# Patient Record
Sex: MALE
[De-identification: names, ages, dates, MRNs, and addresses within clinical notes are randomized per-mention and may not be internally consistent; named-entity substitution may affect disease eponyms.]

---

## 2018-02-11 ENCOUNTER — HOSPITAL ENCOUNTER (INPATIENT)
Dept: HOSPITAL 88 - ER | Age: 73
LOS: 6 days | Discharge: HOME | DRG: 392 | End: 2018-02-17
Attending: INTERNAL MEDICINE | Admitting: INTERNAL MEDICINE
Payer: MEDICARE

## 2018-02-11 VITALS — SYSTOLIC BLOOD PRESSURE: 130 MMHG | DIASTOLIC BLOOD PRESSURE: 80 MMHG

## 2018-02-11 VITALS — HEIGHT: 69 IN | BODY MASS INDEX: 27.46 KG/M2 | WEIGHT: 185.44 LBS

## 2018-02-11 VITALS — DIASTOLIC BLOOD PRESSURE: 71 MMHG | SYSTOLIC BLOOD PRESSURE: 123 MMHG

## 2018-02-11 DIAGNOSIS — K64.8: ICD-10-CM

## 2018-02-11 DIAGNOSIS — D12.4: ICD-10-CM

## 2018-02-11 DIAGNOSIS — E87.6: ICD-10-CM

## 2018-02-11 DIAGNOSIS — K29.70: Primary | ICD-10-CM

## 2018-02-11 DIAGNOSIS — R74.8: ICD-10-CM

## 2018-02-11 DIAGNOSIS — E78.5: ICD-10-CM

## 2018-02-11 DIAGNOSIS — I77.810: ICD-10-CM

## 2018-02-11 DIAGNOSIS — I35.2: ICD-10-CM

## 2018-02-11 DIAGNOSIS — I25.10: ICD-10-CM

## 2018-02-11 DIAGNOSIS — E11.42: ICD-10-CM

## 2018-02-11 DIAGNOSIS — D12.5: ICD-10-CM

## 2018-02-11 DIAGNOSIS — E11.65: ICD-10-CM

## 2018-02-11 DIAGNOSIS — D12.2: ICD-10-CM

## 2018-02-11 DIAGNOSIS — I11.9: ICD-10-CM

## 2018-02-11 DIAGNOSIS — D64.9: ICD-10-CM

## 2018-02-11 DIAGNOSIS — I25.110: ICD-10-CM

## 2018-02-11 DIAGNOSIS — K20.9: ICD-10-CM

## 2018-02-11 LAB
ALBUMIN SERPL-MCNC: 4.2 G/DL (ref 3.5–5)
ALBUMIN/GLOB SERPL: 1.1 {RATIO} (ref 0.8–2)
ALP SERPL-CCNC: 70 IU/L (ref 40–150)
ALT SERPL-CCNC: 11 IU/L (ref 0–55)
ANION GAP SERPL CALC-SCNC: 15.6 MMOL/L (ref 8–16)
BASOPHILS # BLD AUTO: 0 10*3/UL (ref 0–0.1)
BASOPHILS NFR BLD AUTO: 0.7 % (ref 0–1)
BUN SERPL-MCNC: 20 MG/DL (ref 7–26)
BUN/CREAT SERPL: 18 (ref 6–25)
CALCIUM SERPL-MCNC: 9.9 MG/DL (ref 8.4–10.2)
CHLORIDE SERPL-SCNC: 105 MMOL/L (ref 98–107)
CHOLEST SERPL-MCNC: 155 MD/DL (ref 0–199)
CHOLEST/HDLC SERPL: 4.8 {RATIO} (ref 3.9–4.7)
CK MB SERPL-MCNC: 2.3 NG/ML (ref 0–5)
CK SERPL-CCNC: 84 IU/L (ref 30–200)
CO2 SERPL-SCNC: 22 MMOL/L (ref 22–29)
DEPRECATED APTT PLAS QN: 27.9 SECONDS (ref 23.8–35.5)
DEPRECATED INR PLAS: 1.04
DEPRECATED NEUTROPHILS # BLD AUTO: 3.3 10*3/UL (ref 2.1–6.9)
EGFRCR SERPLBLD CKD-EPI 2021: > 60 ML/MIN (ref 60–?)
EOSINOPHIL # BLD AUTO: 0.1 10*3/UL (ref 0–0.4)
EOSINOPHIL NFR BLD AUTO: 2.3 % (ref 0–6)
ERYTHROCYTE [DISTWIDTH] IN CORD BLOOD: 12.9 % (ref 11.7–14.4)
GLOBULIN PLAS-MCNC: 3.7 G/DL (ref 2.3–3.5)
GLUCOSE SERPLBLD-MCNC: 318 MG/DL (ref 74–118)
HCT VFR BLD AUTO: 37.4 % (ref 38.2–49.6)
HDLC SERPL-MSCNC: 32 MG/DL (ref 40–60)
HGB BLD-MCNC: 13 G/DL (ref 14–18)
LDLC SERPL CALC-MCNC: 75 MG/DL (ref 60–130)
LYMPHOCYTES # BLD: 1.6 10*3/UL (ref 1–3.2)
LYMPHOCYTES NFR BLD AUTO: 29.1 % (ref 18–39.1)
MCH RBC QN AUTO: 31.3 PG (ref 28–32)
MCHC RBC AUTO-ENTMCNC: 34.8 G/DL (ref 31–35)
MCV RBC AUTO: 90.1 FL (ref 81–99)
MONOCYTES # BLD AUTO: 0.4 10*3/UL (ref 0.2–0.8)
MONOCYTES NFR BLD AUTO: 7.7 % (ref 4.4–11.3)
NEUTS SEG NFR BLD AUTO: 60 % (ref 38.7–80)
PLATELET # BLD AUTO: 221 X10E3/UL (ref 140–360)
POTASSIUM SERPL-SCNC: 3.6 MMOL/L (ref 3.5–5.1)
PROTHROMBIN TIME: 12.8 SECONDS (ref 11.9–14.5)
RBC # BLD AUTO: 4.15 X10E6/UL (ref 4.3–5.7)
SODIUM SERPL-SCNC: 139 MMOL/L (ref 136–145)
TRIGL SERPL-MCNC: 241 MG/DL (ref 0–149)
TSH SERPL DL<=0.005 MIU/L-ACNC: 2.34 UIU/ML (ref 0.35–4.94)

## 2018-02-11 PROCEDURE — 80061 LIPID PANEL: CPT

## 2018-02-11 PROCEDURE — 82553 CREATINE MB FRACTION: CPT

## 2018-02-11 PROCEDURE — 93005 ELECTROCARDIOGRAM TRACING: CPT

## 2018-02-11 PROCEDURE — 93458 L HRT ARTERY/VENTRICLE ANGIO: CPT

## 2018-02-11 PROCEDURE — 75625 CONTRAST EXAM ABDOMINL AORTA: CPT

## 2018-02-11 PROCEDURE — 71045 X-RAY EXAM CHEST 1 VIEW: CPT

## 2018-02-11 PROCEDURE — 82607 VITAMIN B-12: CPT

## 2018-02-11 PROCEDURE — 44391 COLONOSCOPY FOR BLEEDING: CPT

## 2018-02-11 PROCEDURE — 83880 ASSAY OF NATRIURETIC PEPTIDE: CPT

## 2018-02-11 PROCEDURE — 85025 COMPLETE CBC W/AUTO DIFF WBC: CPT

## 2018-02-11 PROCEDURE — 84443 ASSAY THYROID STIM HORMONE: CPT

## 2018-02-11 PROCEDURE — 82728 ASSAY OF FERRITIN: CPT

## 2018-02-11 PROCEDURE — 85045 AUTOMATED RETICULOCYTE COUNT: CPT

## 2018-02-11 PROCEDURE — 82746 ASSAY OF FOLIC ACID SERUM: CPT

## 2018-02-11 PROCEDURE — 85610 PROTHROMBIN TIME: CPT

## 2018-02-11 PROCEDURE — 88312 SPECIAL STAINS GROUP 1: CPT

## 2018-02-11 PROCEDURE — 93452 LEFT HRT CATH W/VENTRCLGRPHY: CPT

## 2018-02-11 PROCEDURE — 77002 NEEDLE LOCALIZATION BY XRAY: CPT

## 2018-02-11 PROCEDURE — 84484 ASSAY OF TROPONIN QUANT: CPT

## 2018-02-11 PROCEDURE — 43239 EGD BIOPSY SINGLE/MULTIPLE: CPT

## 2018-02-11 PROCEDURE — 80048 BASIC METABOLIC PNL TOTAL CA: CPT

## 2018-02-11 PROCEDURE — 45385 COLONOSCOPY W/LESION REMOVAL: CPT

## 2018-02-11 PROCEDURE — 93306 TTE W/DOPPLER COMPLETE: CPT

## 2018-02-11 PROCEDURE — 83036 HEMOGLOBIN GLYCOSYLATED A1C: CPT

## 2018-02-11 PROCEDURE — 36415 COLL VENOUS BLD VENIPUNCTURE: CPT

## 2018-02-11 PROCEDURE — 85730 THROMBOPLASTIN TIME PARTIAL: CPT

## 2018-02-11 PROCEDURE — 83540 ASSAY OF IRON: CPT

## 2018-02-11 PROCEDURE — 36140 INTRO NDL ICATH UPR/LXTR ART: CPT

## 2018-02-11 PROCEDURE — 88305 TISSUE EXAM BY PATHOLOGIST: CPT

## 2018-02-11 PROCEDURE — 84466 ASSAY OF TRANSFERRIN: CPT

## 2018-02-11 PROCEDURE — 36200 PLACE CATHETER IN AORTA: CPT

## 2018-02-11 PROCEDURE — 82948 REAGENT STRIP/BLOOD GLUCOSE: CPT

## 2018-02-11 PROCEDURE — 45384 COLONOSCOPY W/LESION REMOVAL: CPT

## 2018-02-11 PROCEDURE — 82550 ASSAY OF CK (CPK): CPT

## 2018-02-11 PROCEDURE — 80053 COMPREHEN METABOLIC PANEL: CPT

## 2018-02-11 RX ADMIN — SODIUM CHLORIDE SCH MLS/HR: 9 INJECTION, SOLUTION INTRAVENOUS at 17:24

## 2018-02-11 RX ADMIN — NITROGLYCERIN SCH GM: 20 OINTMENT TOPICAL at 23:10

## 2018-02-11 RX ADMIN — INSULIN HUMAN SCH UNIT: 100 INJECTION, SOLUTION PARENTERAL at 17:02

## 2018-02-11 RX ADMIN — ENOXAPARIN SODIUM SCH MG: 100 INJECTION SUBCUTANEOUS at 17:24

## 2018-02-11 RX ADMIN — PRAVASTATIN SODIUM SCH MG: 20 TABLET ORAL at 21:00

## 2018-02-11 RX ADMIN — FAMOTIDINE SCH MG: 20 TABLET, FILM COATED ORAL at 17:24

## 2018-02-11 RX ADMIN — INSULIN HUMAN SCH UNIT: 100 INJECTION, SOLUTION PARENTERAL at 21:33

## 2018-02-11 NOTE — DIAGNOSTIC IMAGING REPORT
EXAMINATION: Chest,  CHEST SINGLE (PORTABLE)    



INDICATION: Chest pain



COMPARISON:  None

     

FINDINGS:    



LINES:  None.



Heart:  Normal cardiac silhouette.



Vascular: The pulmonary vasculature is within normal limits.  



Mediastinum: No mediastinal, hilar, or axillary mass or lymphadenopathy.



Lungs: No parenchymal mass.  No focal consolidation.



Pleura:  No pleural effusion.  No pneumothorax.



Bones: No acute osseous abnormality.  Degenerative changes of the thoracic

spine.



Soft tissues:  Normal.



Impression: 

No acute radiographic abnormality.



Signed by: Dr. Skyler Cleveland M.D. on 2/11/2018 4:00 PM

## 2018-02-11 NOTE — XMS REPORT
Patient Summary Document

 Created on: 2018



VICKI JOSUE

External Reference #: [id number]

: 1945

Sex: Male



Demographics







 Address  [address] [place] [address]

 

 Home Phone  [phone]

 

 Preferred Language  Unknown

 

 Marital Status  Unknown

 

 Anglican Affiliation  Unknown

 

 Race  Unknown

 

 Additional Race(s)  

 

 Ethnic Group  Unknown





Author







 Author  [facility]

 

 Address  Unknown

 

 Phone  Unavailable







Care Team Providers







 Care Team Member Name  Role  Phone

 

 YULIA BARONE  Unavailable  Unavailable







Problems

This patient has no known problems.



Allergies, Adverse Reactions, Alerts

This patient has no known allergies or adverse reactions.



Medications

This patient has no known medications.



Results







 Test Description  Test Time  Test Comments  Text Results  Atomic Results  
Result Comments









 Stress Test - Treadmill ONLY         [place] [address]    Patient Name : 
VICKI JOSUE         MR #: [id number]   : 1945         Age/Sex: 72
/M      Acct # : [id number]           Adm Physician  : YULIA BARONE MD       
Admit Date  :         Location : [facility]    Room/Bed :           ____________________
_______________________________________________________________________________
     REPORT: Cardiology Report       DATE OF STUDY:  2017       
LEXISCAN NUCLEAR STRESS TEST      REQUESTING PHYSICIAN:  Dr. Barone.        
DESCRIPTION OF PROCEDURE:  After informed consent, patient was brought to    
the stress lab.  Dr. Barone performed the stress portion of the study.  He    
was given 10 millicuries of technetium 99 Myoview, and myocardial perfusion    
SPECT images obtained in the horizontal long-axis, short-axis and vertical    
long-axis views.  Subsequently, patient was given 0.4 mg Lexiscan over 10    
seconds.  The patient was given 30 mCi of technetium 99 Myoview and    
myocardial perfusion SPECT images obtained in the horizontal long-axis,    short
-axis and vertical long-axis views.   Gating images were also    obtained.  
Patient tolerated the procedure without any complications.        REPORT:  
Baseline EKG shows sinus bradycardia 55 beats per minute.  Left    axis 
deviation.  Normal intervals.  Nonspecific ST-T changes.      PARAMETERS   1. 
Resting heart rate 60 56 beats per minute.   2. Maximum heart rate is 73 beats 
per minute.   3. Resting blood pressure 155/67 mmHg.    4. Maximum blood 
pressure 160/65 mmHg.      REASON FOR TERMINATION:  Endpoint attained.      
INTERPRETATION   1. Negative for chest pain.   2. Negative for arrhythmias.   
3. Blood pressure response consistent with Lexiscan.   4. No significant ST-T 
changes seen during Lexiscan infusion compared to    baseline.   5. Analysis of 
SPECT images reveals dilated left ventricle.   6. Patchy radioisotope uptake in 
the inferior basal wall without any    significant perfusion defects.      
CONCLUSIONS:     1. No evidence significant ischemia or infarction on this 
study.   2. No wall motion abnormalities.     3. Overall ejection fraction is 52
%.              DD:  10/18/2017 18:01   DT:  10/18/2017 18:10   Job#:  [id number] 
      cc:   YULIA BARONE           Signature                             
                                      Date                           Dictated By
: CAROLYNN NICHOLAS MD  Transcribed By: NARINDER on 10/18/17   <Electronically 
signed by CAROLYNN NICHOLAS MD><<Signature on File>>10/20/17 0734    COPY TO:

## 2018-02-11 NOTE — HISTORY AND PHYSICAL
PRIMARY CARE PHYSICIAN:  Dr. Powers.



CARDIOLOGIST:  Dr. Ramirez.



CHIEF COMPLAINT:  Chest pain.



HISTORY OF PRESENT ILLNESS:  This is a 72-year-old man with a history of 

diabetes mellitus and hypertension, now developing left-sided chest pain 

with mild radiation to the neck. He denies any shortness of breath or 

dizziness. Patient had a chemical stress test 3 months ago and with an 

echocardiogram in which he says the only finding was some valve disease. He 

denies any fever, chills, sweats. Denies any other symptoms.



PAST MEDICAL HISTORY:  Hypertension, hyperlipidemia, diabetes mellitus type 

2.



PAST SURGICAL HISTORY:  Knee surgery.



ALLERGIES:  PER THE ELECTRONIC MEDICAL RECORDS.



FAMILY HISTORY/SOCIAL HISTORY:  Patient is . He has 3 children. No 

alcohol, illicit drugs or cigarettes.



MEDICATIONS:  Per the electronic medical records. Medications reviewed.



REVIEW OF SYSTEMS:  Denies any dizziness.



VITAL SIGNS:  Have been reviewed.



PHYSICAL EXAMINATION

GENERAL APPEARANCE:  A tired-appearing man resting in bed.

HEENT:  Anicteric. Pupils responsive to light. No oral lesions. 

CARDIOVASCULAR:  Normal S1/S2. He has a 3/6 systolic murmur.

LUNGS:  Moderate breath sounds.

ABDOMEN:  Soft, nontender, nondistended. 

EXTREMITIES:  No edema or calf tenderness. 

NEUROLOGICALLY:  Alert and oriented x3. Moving all extremities. 

SKIN:  Dry.

PSYCHIATRIC:  Normal affect.



LABS:  Reviewed.



ASSESSMENT AND PLAN:  A 72-year-old  man.

1. Left-sided chest pain. There is no tenderness on palpation. He had a 

stress test 3 months ago. Will re-consult his cardiologist. Trend his 

cardiac enzymes. Obtain a lipid panel and a TSH.

2. Diabetes mellitus type 2. Will obtain hemoglobin A1c.

3. Hypertension/hyperlipidemia. Restart home medications and obtain 

lipid panel.

4. Diabetic neuropathy. Continue gabapentin. 

5. A 3/6 systolic murmur. Patient states that he did have a valvular 

disease on the last echocardiogram. Will follow the echocardiogram this 

time.

6. Prophylaxis. Will use Pepcid and Lovenox treatment dose q.12.

7. Disposition. Continue aspirin. Follow up echocardiogram. Follow up 

cardiology recommendations and trending the enzymes.











DD:  02/11/2018 17:19

DT:  02/11/2018 18:07

Job#:  [id number]

## 2018-02-12 VITALS — DIASTOLIC BLOOD PRESSURE: 72 MMHG | SYSTOLIC BLOOD PRESSURE: 175 MMHG

## 2018-02-12 VITALS — SYSTOLIC BLOOD PRESSURE: 141 MMHG | DIASTOLIC BLOOD PRESSURE: 65 MMHG

## 2018-02-12 VITALS — SYSTOLIC BLOOD PRESSURE: 175 MMHG | DIASTOLIC BLOOD PRESSURE: 72 MMHG

## 2018-02-12 VITALS — DIASTOLIC BLOOD PRESSURE: 63 MMHG | SYSTOLIC BLOOD PRESSURE: 140 MMHG

## 2018-02-12 VITALS — SYSTOLIC BLOOD PRESSURE: 106 MMHG | DIASTOLIC BLOOD PRESSURE: 54 MMHG

## 2018-02-12 VITALS — SYSTOLIC BLOOD PRESSURE: 113 MMHG | DIASTOLIC BLOOD PRESSURE: 65 MMHG

## 2018-02-12 VITALS — SYSTOLIC BLOOD PRESSURE: 117 MMHG | DIASTOLIC BLOOD PRESSURE: 67 MMHG

## 2018-02-12 LAB
ANION GAP SERPL CALC-SCNC: 10.3 MMOL/L (ref 8–16)
BASOPHILS # BLD AUTO: 0 10*3/UL (ref 0–0.1)
BASOPHILS NFR BLD AUTO: 0.9 % (ref 0–1)
BUN SERPL-MCNC: 13 MG/DL (ref 7–26)
BUN/CREAT SERPL: 16 (ref 6–25)
CALCIUM SERPL-MCNC: 8.6 MG/DL (ref 8.4–10.2)
CHLORIDE SERPL-SCNC: 108 MMOL/L (ref 98–107)
CHOLEST SERPL-MCNC: 119 MD/DL (ref 0–199)
CHOLEST/HDLC SERPL: 5.2 {RATIO} (ref 3.9–4.7)
CK MB SERPL-MCNC: 2.2 NG/ML (ref 0–5)
CK SERPL-CCNC: 55 IU/L (ref 30–200)
CO2 SERPL-SCNC: 22 MMOL/L (ref 22–29)
DEPRECATED NEUTROPHILS # BLD AUTO: 1.9 10*3/UL (ref 2.1–6.9)
EGFRCR SERPLBLD CKD-EPI 2021: > 60 ML/MIN (ref 60–?)
EOSINOPHIL # BLD AUTO: 0.2 10*3/UL (ref 0–0.4)
EOSINOPHIL NFR BLD AUTO: 4.5 % (ref 0–6)
ERYTHROCYTE [DISTWIDTH] IN CORD BLOOD: 13 % (ref 11.7–14.4)
GLUCOSE SERPLBLD-MCNC: 123 MG/DL (ref 74–118)
HCT VFR BLD AUTO: 30.2 % (ref 38.2–49.6)
HDLC SERPL-MSCNC: 23 MG/DL (ref 40–60)
HGB BLD-MCNC: 10.4 G/DL (ref 14–18)
LDLC SERPL CALC-MCNC: 58 MG/DL (ref 60–130)
LYMPHOCYTES # BLD: 2.1 10*3/UL (ref 1–3.2)
LYMPHOCYTES NFR BLD AUTO: 45.4 % (ref 18–39.1)
MCH RBC QN AUTO: 31.2 PG (ref 28–32)
MCHC RBC AUTO-ENTMCNC: 34.4 G/DL (ref 31–35)
MCV RBC AUTO: 90.7 FL (ref 81–99)
MONOCYTES # BLD AUTO: 0.4 10*3/UL (ref 0.2–0.8)
MONOCYTES NFR BLD AUTO: 8 % (ref 4.4–11.3)
NEUTS SEG NFR BLD AUTO: 41 % (ref 38.7–80)
PLATELET # BLD AUTO: 182 X10E3/UL (ref 140–360)
POTASSIUM SERPL-SCNC: 3.3 MMOL/L (ref 3.5–5.1)
RBC # BLD AUTO: 3.33 X10E6/UL (ref 4.3–5.7)
SODIUM SERPL-SCNC: 137 MMOL/L (ref 136–145)
TRIGL SERPL-MCNC: 191 MG/DL (ref 0–149)

## 2018-02-12 RX ADMIN — INSULIN HUMAN SCH UNIT: 100 INJECTION, SOLUTION PARENTERAL at 12:00

## 2018-02-12 RX ADMIN — INSULIN HUMAN SCH UNIT: 100 INJECTION, SOLUTION PARENTERAL at 15:44

## 2018-02-12 RX ADMIN — Medication SCH MG: at 08:55

## 2018-02-12 RX ADMIN — AMLODIPINE BESYLATE SCH MG: 5 TABLET ORAL at 08:55

## 2018-02-12 RX ADMIN — Medication SCH MG: at 17:07

## 2018-02-12 RX ADMIN — PRAVASTATIN SODIUM SCH MG: 20 TABLET ORAL at 20:46

## 2018-02-12 RX ADMIN — FENOFIBRATE SCH MG: 145 TABLET ORAL at 09:01

## 2018-02-12 RX ADMIN — ASPIRIN SCH MG: 81 TABLET, COATED ORAL at 08:55

## 2018-02-12 RX ADMIN — NITROGLYCERIN SCH GM: 20 OINTMENT TOPICAL at 10:24

## 2018-02-12 RX ADMIN — FAMOTIDINE SCH MG: 20 TABLET, FILM COATED ORAL at 17:07

## 2018-02-12 RX ADMIN — SODIUM CHLORIDE SCH MLS/HR: 9 INJECTION, SOLUTION INTRAVENOUS at 20:05

## 2018-02-12 RX ADMIN — FAMOTIDINE SCH MG: 20 TABLET, FILM COATED ORAL at 04:50

## 2018-02-12 RX ADMIN — NITROGLYCERIN SCH GM: 20 OINTMENT TOPICAL at 20:46

## 2018-02-12 RX ADMIN — NITROGLYCERIN SCH GM: 20 OINTMENT TOPICAL at 03:29

## 2018-02-12 RX ADMIN — SODIUM CHLORIDE SCH MLS/HR: 9 INJECTION, SOLUTION INTRAVENOUS at 06:05

## 2018-02-12 RX ADMIN — NITROGLYCERIN SCH GM: 20 OINTMENT TOPICAL at 15:00

## 2018-02-12 RX ADMIN — ENOXAPARIN SODIUM SCH MG: 100 INJECTION SUBCUTANEOUS at 04:39

## 2018-02-12 RX ADMIN — INSULIN HUMAN SCH UNIT: 100 INJECTION, SOLUTION PARENTERAL at 07:30

## 2018-02-12 RX ADMIN — INSULIN HUMAN SCH UNIT: 100 INJECTION, SOLUTION PARENTERAL at 20:48

## 2018-02-12 NOTE — CONSULTATION
DATE OF CONSULTATION:  February 12, 2018 



CARDIOLOGY CONSULTATION



HISTORY OF PRESENT ILLNESS:  This 72-year-old patient was kindly referred 

by Dr. Holland for cardiovascular evaluation.  The patient stated that prior 

to visiting at the emergency room he was having a tightness in his chest 

which apparently started after some exposure to the cold weather.  This 

discomfort persisted for about 2 more hours after admission.  He is 

comfortable now but still having some heaviness in the chest.  The patient 

was extensively evaluated in October of 2007 when he felt he wanted to have 

more surgery or arthroscopy on his right knee by Dr. Cordova in [place], 

and he was referred by Dr. Powers for a stress test/echocardiogram, and the 

patient had a nuclear stress test which showed some left ventricular 

dilation.  There was some mild uptake deficit in the apical and inferior 

basilar region; however, no definite evidence of myocardial ischemia was 

identified.  The patient has been seen for 8 years because of a heart 

murmur and aortic stenosis, and his latest echocardiogram in October of 2007 shows a normal ejection fraction.  There was diastolic dysfunction of 

the left ventricle and moderately severe left ventricular hypertrophy, and 

the aortic valve area was 1.1.  There also was some moderately severe 

aortic regurgitation, and some dilation of the ascending aorta was also 

noted.  The patient also underwent an arterial ultrasound study of the 

lower extremities, which mainly showed severe tibioperoneal disease and 

diminished AB indices with 0.66 on the right and 0.76 on the left.



PAST HISTORY:  Reveals that he had eye surgery in 2005.  He had multiple 

fractures of the right knee, right forearm and right ribs in 2003 and had 

surgery on the right knee which still shows a scar in the medial aspect.



ALLERGIES:  NONE KNOWN.



SOCIAL HISTORY:  Negative.



FAMILY HISTORY:  Noncontributory.



PHYSICAL EXAMINATION:  

VITAL SIGNS:  Reveals a blood pressure 140/65.  Carotid pulses are present. 

 The patient is afebrile.  Oxygen saturation is 95%.

NECK:  Carotid pulses are palpable and present.

CHEST:  Clear to auscultation.  

CARDIOVASCULAR SYSTEM:  Reveals a normal apical impulse.  The rhythm is 

regular.  First and second are normal.  There is no S3.  There is a harsh 

systolic ejection murmur 3/6 over the aortic area, radiating to the neck 

and the apex.  

ABDOMEN:  Soft.  There is no tenderness or organomegaly.  

EXTREMITIES:  Pedal pulses could not be palpated.  Popliteal pulses are 2+ 

bilaterally.  There is no peripheral edema.  

NEUROLOGIC:  Examination is intact.



LABS:  Since admission there has been some slight elevation of the troponin 

level.  Also, a drop in the hemoglobin and hematocrit has been noted with 

slight hypokalemia.



IMPRESSION:  

1. Chest pain syndrome with elevation of troponin indicative of 

non-ST-elevation myocardial infarction. 

2. Severe aortic stenosis and aortic insufficiency.

3. Hypertensive cardiovascular disease.

4. Diabetes mellitus.

5. Anemia and hypokalemia.

6. Peripheral vascular disease of the tibioperoneal vessel.

7. Diabetic neuropathy.

8. Hyperlipidemia.



I have discussed the findings in detail with the patient, and I would 

recommend a cardiac catheterization, particularly since some rise in the 

troponin level.  Also, at this point I would recommend to stop the 

patient's Lovenox because of the drop in the H\T\H.  The patient has no GI 

symptoms, denies any melena or hematemesis, and I would like to recheck the 

patient's CBC.  Otherwise, I agree completely with the present excellent 

management and medications.



 





DD:  02/12/2018 16:08

DT:  02/12/2018 16:57

Job#:  [id number]

## 2018-02-12 NOTE — PROGRESS NOTE
DATE:  February 12, 2018 



TIME:  6 a.m.



OVERNIGHT:  No real chest pain.



REVIEW OF SYSTEMS:  Denies any dizziness.



PHYSICAL EXAMINATION 

VITAL SIGNS:  Reviewed.

GENERAL:  A tired-appearing man resting in bed. 

HEENT:  Anicteric.  

CARDIOVASCULAR:  Normal S1 and S2.  A 3/6 systolic murmur. 

LUNGS:  Moderate breath sounds.  

ABDOMEN:  Soft, nontender and nondistended.  

EXTREMITIES:  No edema or calf tenderness.

NEUROLOGICAL:  Alert and oriented times 3.  Moving all extremities.

SKIN:  Dry.

PSYCHIATRIC:  Flat affect.



LABS:  Reviewed.



MEDICATIONS:  Reviewed.



ASSESSMENT:  A 72-year-old man with:

1.  Left-sided chest pain.

2.  Diabetes mellitus, type 2. 

3.  Hypertension/hyperlipidemia.

4.  Diabetic neuropathy.

5.  A 3/6 systolic murmur.





PLAN 

1.  Hemoglobin A1c 8.6, LDL 75 and triglycerides 241.

2.  Follow up labs this morning.

3.  Continue Lovenox b.i.d. treatment. 

4.  Continue blood pressure control.  Continue fenofibrate.

5.  Continue 81 mg.

6.  Follow up cardiology recommendations. 









DD:  02/12/2018 06:49

DT:  02/12/2018 06:50

Job#:  [id number]

## 2018-02-13 VITALS — DIASTOLIC BLOOD PRESSURE: 72 MMHG | SYSTOLIC BLOOD PRESSURE: 162 MMHG

## 2018-02-13 VITALS — DIASTOLIC BLOOD PRESSURE: 68 MMHG | SYSTOLIC BLOOD PRESSURE: 155 MMHG

## 2018-02-13 VITALS — SYSTOLIC BLOOD PRESSURE: 145 MMHG | DIASTOLIC BLOOD PRESSURE: 72 MMHG

## 2018-02-13 VITALS — SYSTOLIC BLOOD PRESSURE: 153 MMHG | DIASTOLIC BLOOD PRESSURE: 69 MMHG

## 2018-02-13 VITALS — SYSTOLIC BLOOD PRESSURE: 143 MMHG | DIASTOLIC BLOOD PRESSURE: 65 MMHG

## 2018-02-13 VITALS — DIASTOLIC BLOOD PRESSURE: 72 MMHG | SYSTOLIC BLOOD PRESSURE: 145 MMHG

## 2018-02-13 VITALS — SYSTOLIC BLOOD PRESSURE: 164 MMHG | DIASTOLIC BLOOD PRESSURE: 82 MMHG

## 2018-02-13 VITALS — DIASTOLIC BLOOD PRESSURE: 65 MMHG | SYSTOLIC BLOOD PRESSURE: 144 MMHG

## 2018-02-13 LAB
BASOPHILS # BLD AUTO: 0 10*3/UL (ref 0–0.1)
BASOPHILS NFR BLD AUTO: 0.6 % (ref 0–1)
DEPRECATED NEUTROPHILS # BLD AUTO: 2 10*3/UL (ref 2.1–6.9)
EOSINOPHIL # BLD AUTO: 0.2 10*3/UL (ref 0–0.4)
EOSINOPHIL NFR BLD AUTO: 4.1 % (ref 0–6)
ERYTHROCYTE [DISTWIDTH] IN CORD BLOOD: 12.9 % (ref 11.7–14.4)
HCT VFR BLD AUTO: 29.9 % (ref 38.2–49.6)
HGB BLD-MCNC: 10.3 G/DL (ref 14–18)
LYMPHOCYTES # BLD: 2 10*3/UL (ref 1–3.2)
LYMPHOCYTES NFR BLD AUTO: 42.2 % (ref 18–39.1)
MCH RBC QN AUTO: 31.3 PG (ref 28–32)
MCHC RBC AUTO-ENTMCNC: 34.4 G/DL (ref 31–35)
MCV RBC AUTO: 90.9 FL (ref 81–99)
MONOCYTES # BLD AUTO: 0.4 10*3/UL (ref 0.2–0.8)
MONOCYTES NFR BLD AUTO: 9.2 % (ref 4.4–11.3)
NEUTS SEG NFR BLD AUTO: 43.5 % (ref 38.7–80)
PLATELET # BLD AUTO: 187 X10E3/UL (ref 140–360)
RBC # BLD AUTO: 3.29 X10E6/UL (ref 4.3–5.7)

## 2018-02-13 PROCEDURE — B2111ZZ FLUOROSCOPY OF MULTIPLE CORONARY ARTERIES USING LOW OSMOLAR CONTRAST: ICD-10-PCS | Performed by: INTERNAL MEDICINE

## 2018-02-13 PROCEDURE — B4101ZZ FLUOROSCOPY OF ABDOMINAL AORTA USING LOW OSMOLAR CONTRAST: ICD-10-PCS | Performed by: INTERNAL MEDICINE

## 2018-02-13 PROCEDURE — 4A023N7 MEASUREMENT OF CARDIAC SAMPLING AND PRESSURE, LEFT HEART, PERCUTANEOUS APPROACH: ICD-10-PCS | Performed by: INTERNAL MEDICINE

## 2018-02-13 PROCEDURE — B3101ZZ FLUOROSCOPY OF THORACIC AORTA USING LOW OSMOLAR CONTRAST: ICD-10-PCS | Performed by: INTERNAL MEDICINE

## 2018-02-13 PROCEDURE — B2151ZZ FLUOROSCOPY OF LEFT HEART USING LOW OSMOLAR CONTRAST: ICD-10-PCS | Performed by: INTERNAL MEDICINE

## 2018-02-13 RX ADMIN — NITROGLYCERIN SCH GM: 20 OINTMENT TOPICAL at 08:12

## 2018-02-13 RX ADMIN — INSULIN HUMAN SCH UNIT: 100 INJECTION, SOLUTION PARENTERAL at 16:14

## 2018-02-13 RX ADMIN — Medication SCH MG: at 08:11

## 2018-02-13 RX ADMIN — NITROGLYCERIN SCH GM: 20 OINTMENT TOPICAL at 15:00

## 2018-02-13 RX ADMIN — NITROGLYCERIN SCH GM: 20 OINTMENT TOPICAL at 04:58

## 2018-02-13 RX ADMIN — NITROGLYCERIN SCH GM: 20 OINTMENT TOPICAL at 21:00

## 2018-02-13 RX ADMIN — PRAVASTATIN SODIUM SCH MG: 20 TABLET ORAL at 21:47

## 2018-02-13 RX ADMIN — ASPIRIN SCH MG: 81 TABLET, COATED ORAL at 08:11

## 2018-02-13 RX ADMIN — FAMOTIDINE SCH MG: 20 TABLET, FILM COATED ORAL at 17:35

## 2018-02-13 RX ADMIN — INSULIN HUMAN SCH UNIT: 100 INJECTION, SOLUTION PARENTERAL at 07:30

## 2018-02-13 RX ADMIN — FAMOTIDINE SCH MG: 20 TABLET, FILM COATED ORAL at 04:58

## 2018-02-13 RX ADMIN — SODIUM CHLORIDE SCH MLS/HR: 9 INJECTION, SOLUTION INTRAVENOUS at 23:33

## 2018-02-13 RX ADMIN — AMLODIPINE BESYLATE SCH MG: 5 TABLET ORAL at 08:11

## 2018-02-13 RX ADMIN — INSULIN HUMAN SCH UNIT: 100 INJECTION, SOLUTION PARENTERAL at 20:52

## 2018-02-13 RX ADMIN — INSULIN HUMAN SCH UNIT: 100 INJECTION, SOLUTION PARENTERAL at 11:30

## 2018-02-13 RX ADMIN — Medication SCH MG: at 17:35

## 2018-02-13 RX ADMIN — SODIUM CHLORIDE SCH MLS/HR: 9 INJECTION, SOLUTION INTRAVENOUS at 08:11

## 2018-02-13 RX ADMIN — FENOFIBRATE SCH MG: 145 TABLET ORAL at 08:11

## 2018-02-14 VITALS — DIASTOLIC BLOOD PRESSURE: 64 MMHG | SYSTOLIC BLOOD PRESSURE: 145 MMHG

## 2018-02-14 VITALS — SYSTOLIC BLOOD PRESSURE: 149 MMHG | DIASTOLIC BLOOD PRESSURE: 61 MMHG

## 2018-02-14 VITALS — SYSTOLIC BLOOD PRESSURE: 125 MMHG | DIASTOLIC BLOOD PRESSURE: 66 MMHG

## 2018-02-14 VITALS — DIASTOLIC BLOOD PRESSURE: 52 MMHG | SYSTOLIC BLOOD PRESSURE: 108 MMHG

## 2018-02-14 VITALS — DIASTOLIC BLOOD PRESSURE: 74 MMHG | SYSTOLIC BLOOD PRESSURE: 164 MMHG

## 2018-02-14 VITALS — DIASTOLIC BLOOD PRESSURE: 69 MMHG | SYSTOLIC BLOOD PRESSURE: 149 MMHG

## 2018-02-14 LAB
BASOPHILS # BLD AUTO: 0 10*3/UL (ref 0–0.1)
BASOPHILS NFR BLD AUTO: 0.4 % (ref 0–1)
DEPRECATED NEUTROPHILS # BLD AUTO: 3.1 10*3/UL (ref 2.1–6.9)
EOSINOPHIL # BLD AUTO: 0.1 10*3/UL (ref 0–0.4)
EOSINOPHIL NFR BLD AUTO: 2.5 % (ref 0–6)
ERYTHROCYTE [DISTWIDTH] IN CORD BLOOD: 12.9 % (ref 11.7–14.4)
FERRITIN SERPL-MCNC: 99.02 NG/ML (ref 21.81–274.66)
FOLATE SERPL-MCNC: 16.3 NG/ML (ref 7–15.4)
HCT VFR BLD AUTO: 30.7 % (ref 38.2–49.6)
HGB BLD-MCNC: 10.6 G/DL (ref 14–18)
IRON SATN MFR SERPL: 18 % (ref 15–50)
IRON SERPL-MCNC: 66 UG/DL (ref 65–175)
LYMPHOCYTES # BLD: 1.5 10*3/UL (ref 1–3.2)
LYMPHOCYTES NFR BLD AUTO: 28.4 % (ref 18–39.1)
MCH RBC QN AUTO: 31.3 PG (ref 28–32)
MCHC RBC AUTO-ENTMCNC: 34.5 G/DL (ref 31–35)
MCV RBC AUTO: 90.6 FL (ref 81–99)
MONOCYTES # BLD AUTO: 0.5 10*3/UL (ref 0.2–0.8)
MONOCYTES NFR BLD AUTO: 9.1 % (ref 4.4–11.3)
NEUTS SEG NFR BLD AUTO: 59.4 % (ref 38.7–80)
PLATELET # BLD AUTO: 186 X10E3/UL (ref 140–360)
RBC # BLD AUTO: 3.39 X10E6/UL (ref 4.3–5.7)
TIBC SERPL-MCNC: 375 UG/DL (ref 261–478)
TRANSFERRIN SERPL-MCNC: 268 MG/DL (ref 174–364)
VIT B12 BLD-MCNC: 225 PG/ML (ref 213–816)

## 2018-02-14 RX ADMIN — SODIUM CHLORIDE SCH MLS/HR: 9 INJECTION, SOLUTION INTRAVENOUS at 11:10

## 2018-02-14 RX ADMIN — NITROGLYCERIN SCH GM: 20 OINTMENT TOPICAL at 08:08

## 2018-02-14 RX ADMIN — INSULIN HUMAN SCH UNIT: 100 INJECTION, SOLUTION PARENTERAL at 16:30

## 2018-02-14 RX ADMIN — ASPIRIN SCH MG: 81 TABLET, COATED ORAL at 08:07

## 2018-02-14 RX ADMIN — INSULIN HUMAN SCH UNIT: 100 INJECTION, SOLUTION PARENTERAL at 11:30

## 2018-02-14 RX ADMIN — FENOFIBRATE SCH MG: 145 TABLET ORAL at 08:07

## 2018-02-14 RX ADMIN — FAMOTIDINE SCH MG: 20 TABLET, FILM COATED ORAL at 04:30

## 2018-02-14 RX ADMIN — INSULIN HUMAN SCH UNIT: 100 INJECTION, SOLUTION PARENTERAL at 08:00

## 2018-02-14 RX ADMIN — PRAVASTATIN SODIUM SCH MG: 20 TABLET ORAL at 21:01

## 2018-02-14 RX ADMIN — FAMOTIDINE SCH MG: 20 TABLET, FILM COATED ORAL at 16:48

## 2018-02-14 RX ADMIN — Medication SCH MG: at 08:07

## 2018-02-14 RX ADMIN — NITROGLYCERIN SCH GM: 20 OINTMENT TOPICAL at 15:00

## 2018-02-14 RX ADMIN — Medication SCH MG: at 16:48

## 2018-02-14 RX ADMIN — NITROGLYCERIN SCH GM: 20 OINTMENT TOPICAL at 03:00

## 2018-02-14 RX ADMIN — INSULIN HUMAN SCH UNIT: 100 INJECTION, SOLUTION PARENTERAL at 21:21

## 2018-02-14 RX ADMIN — NITROGLYCERIN SCH GM: 20 OINTMENT TOPICAL at 21:01

## 2018-02-14 RX ADMIN — AMLODIPINE BESYLATE SCH MG: 5 TABLET ORAL at 08:07

## 2018-02-14 NOTE — PROGRESS NOTE
DATE:  February 14, 2018 



TIME:  10:21 a.m.



OVERNIGHT:  He had left heart catheterization.



REVIEW OF SYSTEMS:  Denies any chest pain.



VITAL SIGNS:  Reviewed. 



PHYSICAL EXAMINATION 

GENERAL:  A tired-appearing man resting in bed. 

HEENT:  Anicteric.  

CARDIOVASCULAR:  Normal S1 and S2.  A 3/6 systolic murmur. 

LUNGS:  Moderate breath sounds.  

ABDOMEN:  Soft, nontender.  He has a left groin dressing in place, clean 

and dry.

EXTREMITIES:  No edema. 

SKIN:  Dry.

PSYCHIATRIC:  Normal affect.



LABS:  Reviewed.



MEDICATIONS:  Reviewed.



ASSESSMENT:  A 72-year-old man.

1.  Left-sided chest pain.

2.  Diabetes mellitus, type 2.   Hemoglobin A1c 8.6, LDL 75, triglycerides 

241.

3.  Hypertension/hyperlipidemia.

4.  Diabetic neuropathy.

5.  A 3/6 systolic murmur.



PLAN 

1. Follow up catheterization results. 

2. Possible GI evaluation prior to any coronary stenting.

3. Check potassium level this morning.

4. Continue fenofibrate, hydrochlorothiazide, amlodipine, aspirin, 

medication regimen.

5. Follow up GI evaluation.  Follow up cardiology findings on left heart 

catheterization yesterday.  







DD:  02/14/2018 10:22

DT:  02/14/2018 10:40

Job#:  [id number]

## 2018-02-14 NOTE — OPERATIVE REPORT
DATE OF PROCEDURE:  February 13, 2018 



DIAGNOSES  

1. Mild non-ST elevation myocardial infarction.

2. Aortic area valvular disease with stenosis and regurgitation.

3. Hypertensive cardiovascular disease.

4. Diabetes mellitus 



PROCEDURES

1. Cardiac catheterization including selective coronary angiogram and 

left ventricular angiogram.

2. Thoracic aortogram.

3. Abdominal aortogram.

4. Closure of left femoral artery with Angio-Seal Evolution.



DESCRIPTION OF PROCEDURE:  After the usual prepping and draping, the left 

femoral vein and the left femoral artery were punctured percutaneously and 

6-Bel[place]ian arterial and venous sheaths were inserted under modified Seldinger 

technique.



Selective coronary angiogram was performed using modified Shanna type 

catheter.  Because of the patient's dilated ascending aorta, a 6-[place]ian FL 

#5 catheter had to be utilized for cannulation of the left coronary artery. 

 A 6-[place]ian angled pigtail catheter was utilized for thoracic and abdominal 

aortography.  A 6-[place]ian multipurpose catheter with side hose was utilized 

to cross the aortic valve and was used for left ventricular angiography. 



Findings of cardiac catheterization:  The hemodynamics revealed left 

ventricular pressure of 170 mmHg.  The aortic pressure was 160/60 with a 

mean of 116 mmHg.  The left ventricular end-diastolic pressure was elevated 

at 15 peaking at 26 mmHg.



The left main coronary artery was normal.  The left anterior descending 

branch showed some proximal nonobstructive plaques.  There was a 75% 

narrowing in the distal LAD and 99% ostial stenosis of a very small 

diagonal #3.  The diagonal #1 was quite large and was without any disease 

and the diagonal #2 was also a small branch.  The left circumflex and 

obtuse marginal artery #1 were normal.  Obtuse marginal #2 shows a 75% 

segmental narrowing in the proximal portion, however, there was excellent 

runoff and the [place] flow was 3.  The right coronary artery showed proximal 

30% eccentric stenosis and some distal plaque and as noted the patient was 

having a codominant system.  The left ventricular angiogram showed some 

global hypokinesis with an ejection fraction of 45%.  The thoracic 

aortogram showed 2+ to 3+ (moderate to severe aortic insufficiency).  The 

aortic root was dilated, but there was no dissection.  The abdominal 

aortogram showed normal celiac, SMA, IRINEO and renal arteries.  After 

completion of the diagnostic study and removal of catheters angiogram of 

the left groin area in the left anterior oblique position was performed 

prior to closure of the left femoral artery with Angio-Seal.  The venous 

sheath was then removed.  Hemostatic patch was applied and manual pressure 

was utilized to achieve hemostasis.  A pressure dressing was then supplied 

and the patient returned to his room in stable condition.  There were no 

complications.  The procedure was well tolerated.



IMPRESSION

1. Coronary artery disease with 75% stenotic areas in the obtuse 

marginal #2 and the distal left anterior descending.  

2. Left ventricular dysfunction with an ejection fraction of 45%.

3. Mild calcific aortic stenosis with some moderately severe aortic 

insufficiency.

4. Dilated aortic root without any dissection.



The findings of cardiac catheterization were discussed with the family and 

the patient.  No intervention was performed on the 2nd obtuse marginal nor 

on the distal LAD because the patient had significant drop off this 

hemoglobin and hematocrit after admission and at this point there was 

contraindication to use anticoagulation until significance of this drop has 

been further investigated and I recommended as primary investigation to 

have endoscopy by gastroenterologist.  It is of significance the patient 

did receive Lovenox 1 mg/kg during the 1st day of his hospitalization; 

however, it is unclear if this was the reason of the drop of the hemoglobin 

and hematocrit or if there is some underlying reason.  

After we have substantiated and hopefully corrected the problem of the 

patient's drop in hemoglobin and hematocrit, I certainly believe that the 

patient is a good candidate for coronary intervention.









DD:  02/13/2018 23:20

DT:  02/13/2018 23:55

Job#:  [id number] [id number]

## 2018-02-14 NOTE — PROGRESS NOTE
DATE:  February 13, 2018 



TIME:  7:40 a.m.



OVERNIGHT:  No events. 



REVIEW OF SYSTEMS:  Denies any chest pain.



VITAL SIGNS:  Reviewed. 



PHYSICAL EXAMINATION 

GENERAL:  A tired-appearing man resting in bed. 

HEENT:  Anicteric.  

CARDIOVASCULAR:  Normal S1 and S2.  A 3/6 systolic murmur. 

LUNGS:  Moderate breath sounds.  

ABDOMEN:  Soft, nontender. 

EXTREMITIES:  No edema. 

SKIN:  Dry.

PSYCHIATRIC:  Normal affect.



LABS:  Reviewed.



MEDICATIONS:  Reviewed.



ASSESSMENT:  A 72-year-old man.

1.  Left-sided chest pain.

2.  Diabetes mellitus, type 2. 

3.  Hypertension/hyperlipidemia.

4.  Diabetic neuropathy.

5.  A 3/6 systolic murmur.



PLAN 

1. Heart catheterization today.

2. Hemoglobin A1c 8.6, LDL 75 and triglycerides 241.

3. Continue medication regimen.

4. Follow up cardiology's recommendations.







DD:  02/14/2018 10:20

DT:  02/14/2018 10:38

Job#:  [id number]

## 2018-02-15 VITALS — SYSTOLIC BLOOD PRESSURE: 159 MMHG | DIASTOLIC BLOOD PRESSURE: 64 MMHG

## 2018-02-15 VITALS — DIASTOLIC BLOOD PRESSURE: 65 MMHG | SYSTOLIC BLOOD PRESSURE: 136 MMHG

## 2018-02-15 VITALS — SYSTOLIC BLOOD PRESSURE: 169 MMHG | DIASTOLIC BLOOD PRESSURE: 68 MMHG

## 2018-02-15 VITALS — DIASTOLIC BLOOD PRESSURE: 71 MMHG | SYSTOLIC BLOOD PRESSURE: 135 MMHG

## 2018-02-15 VITALS — SYSTOLIC BLOOD PRESSURE: 119 MMHG | DIASTOLIC BLOOD PRESSURE: 66 MMHG

## 2018-02-15 VITALS — DIASTOLIC BLOOD PRESSURE: 68 MMHG | SYSTOLIC BLOOD PRESSURE: 147 MMHG

## 2018-02-15 VITALS — DIASTOLIC BLOOD PRESSURE: 65 MMHG | SYSTOLIC BLOOD PRESSURE: 139 MMHG

## 2018-02-15 PROCEDURE — 0DB78ZX EXCISION OF STOMACH, PYLORUS, VIA NATURAL OR ARTIFICIAL OPENING ENDOSCOPIC, DIAGNOSTIC: ICD-10-PCS | Performed by: INTERNAL MEDICINE

## 2018-02-15 PROCEDURE — 0DBK8ZX EXCISION OF ASCENDING COLON, VIA NATURAL OR ARTIFICIAL OPENING ENDOSCOPIC, DIAGNOSTIC: ICD-10-PCS | Performed by: INTERNAL MEDICINE

## 2018-02-15 PROCEDURE — 0DBN8ZX EXCISION OF SIGMOID COLON, VIA NATURAL OR ARTIFICIAL OPENING ENDOSCOPIC, DIAGNOSTIC: ICD-10-PCS | Performed by: INTERNAL MEDICINE

## 2018-02-15 PROCEDURE — 0DBC8ZX EXCISION OF ILEOCECAL VALVE, VIA NATURAL OR ARTIFICIAL OPENING ENDOSCOPIC, DIAGNOSTIC: ICD-10-PCS | Performed by: INTERNAL MEDICINE

## 2018-02-15 RX ADMIN — Medication SCH MG: at 16:03

## 2018-02-15 RX ADMIN — Medication SCH MG: at 08:24

## 2018-02-15 RX ADMIN — FAMOTIDINE SCH MG: 20 TABLET, FILM COATED ORAL at 04:30

## 2018-02-15 RX ADMIN — SODIUM CHLORIDE SCH MLS/HR: 9 INJECTION, SOLUTION INTRAVENOUS at 16:03

## 2018-02-15 RX ADMIN — PRAVASTATIN SODIUM SCH MG: 20 TABLET ORAL at 20:42

## 2018-02-15 RX ADMIN — NITROGLYCERIN SCH GM: 20 OINTMENT TOPICAL at 08:29

## 2018-02-15 RX ADMIN — SODIUM CHLORIDE SCH MLS/HR: 9 INJECTION, SOLUTION INTRAVENOUS at 00:30

## 2018-02-15 RX ADMIN — INSULIN HUMAN SCH UNIT: 100 INJECTION, SOLUTION PARENTERAL at 07:30

## 2018-02-15 RX ADMIN — INSULIN HUMAN SCH UNIT: 100 INJECTION, SOLUTION PARENTERAL at 11:30

## 2018-02-15 RX ADMIN — INSULIN HUMAN SCH UNIT: 100 INJECTION, SOLUTION PARENTERAL at 20:43

## 2018-02-15 RX ADMIN — ASPIRIN SCH MG: 81 TABLET, COATED ORAL at 08:24

## 2018-02-15 RX ADMIN — FENOFIBRATE SCH MG: 145 TABLET ORAL at 08:25

## 2018-02-15 RX ADMIN — NITROGLYCERIN SCH GM: 20 OINTMENT TOPICAL at 04:00

## 2018-02-15 RX ADMIN — NITROGLYCERIN SCH GM: 20 OINTMENT TOPICAL at 20:42

## 2018-02-15 RX ADMIN — AMLODIPINE BESYLATE SCH MG: 5 TABLET ORAL at 08:24

## 2018-02-15 RX ADMIN — NITROGLYCERIN SCH GM: 20 OINTMENT TOPICAL at 16:03

## 2018-02-15 RX ADMIN — CYANOCOBALAMIN SCH MCG: 1000 INJECTION, SOLUTION INTRAMUSCULAR at 08:29

## 2018-02-15 RX ADMIN — INSULIN HUMAN SCH UNIT: 100 INJECTION, SOLUTION PARENTERAL at 16:30

## 2018-02-15 RX ADMIN — FAMOTIDINE SCH MG: 20 TABLET, FILM COATED ORAL at 16:03

## 2018-02-15 NOTE — PROGRESS NOTE
DATE:  February 15, 2018 



TIME:  7:35 a.m.



OVERNIGHT:  No events.  Awaiting endoscopy review.



REVIEW OF SYSTEMS:  Denies any dizziness.



PHYSICAL EXAMINATION 

VITAL SIGNS:  Reviewed.  

GENERAL:  A tired-appearing man resting in bed.

HEENT:  Anicteric.  

CARDIOVASCULAR:  Normal S1 and S2.  He has a 2-3/6 murmur.  

LUNGS:  Moderate breath sounds.  

ABDOMEN:  Soft, nontender and nondistended.  

EXTREMITIES:  No edema.

SKIN:  Dry.

PSYCHIATRIC:  Normal affect.



LABS:  Reviewed.



MEDICATIONS:  Reviewed.



ASSESSMENT:  A 72-year-old man with:

1.  Left-sided chest pain.

2.  Diabetes mellitus, type 2:  Hemoglobin A1c 8.6, LDL 75 and 

triglycerides 241. 

3.  Hypertension/hyperlipidemia.

4.  Diabetic neuropathy.

5.  Systolic murmur.



PLAN 

1.  Follow up endoscopy planned today.

2.  Will continue his regimen of hydrochlorothiazide, fenofibrate, 

amlodipine, aspirin.

3.  Await further cardiology recommendations.

4.  Glucose well controlled. 

5.  Obtain labs tomorrow.  









DD:  02/15/2018 07:36

DT:  02/15/2018 07:37

Job#:  [id number]

## 2018-02-15 NOTE — OPERATIVE REPORT
DATE OF PROCEDURE:  February 15, 2018 



REFERRING PHYSICIAN:  Dr. Yulia Barone. 



PROCEDURES PERFORMED

1. Esophagogastroduodenoscopy with biopsies.  

2. Colonoscopy with polypectomy and hot biopsies.  



INDICATIONS FOR EGD:  Anemia.



INDICATIONS FOR COLONOSCOPY:  Anemia.



MEDICATION:  Patient was done under MAC.  Please see anesthesiologist's 

note.



PROCEDURE:  With the patient in the left lateral decubitus position, the 

flexible fiberoptic Olympus gastroscope was introduced into the esophagus 

under direct visualization without any difficulty.  There was some patchy 

erythema noted in the distal esophagus.  The scope was then advanced with 

ease into the stomach.  Mucosa overlying the antrum and the body revealed 

some patchy erythema and mild to moderate edema, and biopsies were obtained 

and sent to stain for H. pylori.  The pylorus was of normal contour and 

shape.  It was intubated with ease, and the scope was advanced all the way 

to the 2nd portion of the duodenum.  The scope was then withdrawn slowly, 

and mucosa overlying the proximal 2nd portion and the duodenal bulb 

appeared to be within normal limits.  The scope was then withdrawn back 

into the stomach and retroflexed, and the mucosa overlying the fundus and 

the cardia appeared to be within normal limits.  The scope was then 

straightened out.  The stomach was decompressed.  Scope was subsequently 

withdrawn.  Patient tolerated the procedure well.



IMPRESSION

1. Distal esophagitis.

2. Gastritis, biopsied.  Biopsies sent to stain for H. pylori.



PLAN:  Follow up histology.  Initiate Protonix 40 mg 1 p.o. q.a.m. a.c.



The patient was then turned around.  After adequate lubrication of the anal 

canal, a flexible fiberoptic Olympus colonoscope was inserted into the 

rectum with ease and advanced all the way to the cecum.  A large amount of 

retained fecal material was noted in the cecum.  An approximately 1.2-cm 

sessile, polypoid lesion was removed per snare electrocautery from the 

proximal ascending colon, and the polypectomy site was hemoclipped times 2. 

 The ileocecal valve appeared somewhat lobular, and that was hot biopsied.  

The scope was then withdrawn slowly, and whatever was visualized of the 

mucosa overlying the ascending, transverse and descending grossly appeared 

to be within normal limits; however, there were some scattered retained 

stools in the colon precluding optimal visualization of the mucosa.  One 

polyp was snared from the sigmoid colon.  The rectum grossly appeared to be 

within normal limits.  The scope was then retroflexed into the distal 

rectum, and small internal hemorrhoids were noted, none of which was 

actively bleeding.  The scope was then straightened out.  It was 

subsequently withdrawn.  Patient tolerated the procedure well.  



IMPRESSION

1. Suboptimal prep.

2. Approximately 1.2 cm sessile, polypoid lesion in proximal ascending 

colon, snared and site hemoclipped times 2.

3. Lobular ileocecal valve, hot biopsied.

4. Sigmoid colon polyp, snared.

5. Internal hemorrhoids, none actively bleeding.  



PLAN:  Follow up histology.  The case was discussed with the attending.  

Due to the fact that the patient if a stent is inserted will need to be on 

anticoagulants, we will proceed to reprep the 

patient and carry out a colonoscopy in a.m. to rule out any synchronous 

neoplasm that might have been camouflaged with retained stools.  







DD:  02/15/2018 12:14

DT:  02/15/2018 12:38

Job#:  [id number] 



cc:YULIA BARONE

## 2018-02-16 VITALS — SYSTOLIC BLOOD PRESSURE: 102 MMHG | DIASTOLIC BLOOD PRESSURE: 68 MMHG

## 2018-02-16 VITALS — DIASTOLIC BLOOD PRESSURE: 59 MMHG | SYSTOLIC BLOOD PRESSURE: 121 MMHG

## 2018-02-16 VITALS — DIASTOLIC BLOOD PRESSURE: 64 MMHG | SYSTOLIC BLOOD PRESSURE: 142 MMHG

## 2018-02-16 VITALS — SYSTOLIC BLOOD PRESSURE: 149 MMHG | DIASTOLIC BLOOD PRESSURE: 68 MMHG

## 2018-02-16 LAB
ANION GAP SERPL CALC-SCNC: 11.4 MMOL/L (ref 8–16)
BASOPHILS # BLD AUTO: 0 10*3/UL (ref 0–0.1)
BASOPHILS NFR BLD AUTO: 0.3 % (ref 0–1)
BUN SERPL-MCNC: 7 MG/DL (ref 7–26)
BUN/CREAT SERPL: 9 (ref 6–25)
CALCIUM SERPL-MCNC: 8.6 MG/DL (ref 8.4–10.2)
CHLORIDE SERPL-SCNC: 108 MMOL/L (ref 98–107)
CO2 SERPL-SCNC: 23 MMOL/L (ref 22–29)
DEPRECATED NEUTROPHILS # BLD AUTO: 5 10*3/UL (ref 2.1–6.9)
EGFRCR SERPLBLD CKD-EPI 2021: > 60 ML/MIN (ref 60–?)
EOSINOPHIL # BLD AUTO: 0.1 10*3/UL (ref 0–0.4)
EOSINOPHIL NFR BLD AUTO: 1.7 % (ref 0–6)
ERYTHROCYTE [DISTWIDTH] IN CORD BLOOD: 12.9 % (ref 11.7–14.4)
GLUCOSE SERPLBLD-MCNC: 120 MG/DL (ref 74–118)
HCT VFR BLD AUTO: 29 % (ref 38.2–49.6)
HGB BLD-MCNC: 10.1 G/DL (ref 14–18)
LYMPHOCYTES # BLD: 1.4 10*3/UL (ref 1–3.2)
LYMPHOCYTES NFR BLD AUTO: 19.4 % (ref 18–39.1)
MCH RBC QN AUTO: 31.6 PG (ref 28–32)
MCHC RBC AUTO-ENTMCNC: 34.8 G/DL (ref 31–35)
MCV RBC AUTO: 90.6 FL (ref 81–99)
MONOCYTES # BLD AUTO: 0.6 10*3/UL (ref 0.2–0.8)
MONOCYTES NFR BLD AUTO: 7.9 % (ref 4.4–11.3)
NEUTS SEG NFR BLD AUTO: 70.3 % (ref 38.7–80)
PLATELET # BLD AUTO: 183 X10E3/UL (ref 140–360)
POTASSIUM SERPL-SCNC: 3.4 MMOL/L (ref 3.5–5.1)
RBC # BLD AUTO: 3.2 X10E6/UL (ref 4.3–5.7)
SODIUM SERPL-SCNC: 139 MMOL/L (ref 136–145)

## 2018-02-16 PROCEDURE — 0DBM8ZX EXCISION OF DESCENDING COLON, VIA NATURAL OR ARTIFICIAL OPENING ENDOSCOPIC, DIAGNOSTIC: ICD-10-PCS | Performed by: INTERNAL MEDICINE

## 2018-02-16 PROCEDURE — 0DBK8ZX EXCISION OF ASCENDING COLON, VIA NATURAL OR ARTIFICIAL OPENING ENDOSCOPIC, DIAGNOSTIC: ICD-10-PCS | Performed by: INTERNAL MEDICINE

## 2018-02-16 PROCEDURE — 0DBN8ZX EXCISION OF SIGMOID COLON, VIA NATURAL OR ARTIFICIAL OPENING ENDOSCOPIC, DIAGNOSTIC: ICD-10-PCS | Performed by: INTERNAL MEDICINE

## 2018-02-16 RX ADMIN — NITROGLYCERIN SCH GM: 20 OINTMENT TOPICAL at 08:45

## 2018-02-16 RX ADMIN — FAMOTIDINE SCH MG: 20 TABLET, FILM COATED ORAL at 21:34

## 2018-02-16 RX ADMIN — INSULIN HUMAN SCH UNIT: 100 INJECTION, SOLUTION PARENTERAL at 15:55

## 2018-02-16 RX ADMIN — SODIUM CHLORIDE SCH MLS/HR: 9 INJECTION, SOLUTION INTRAVENOUS at 21:33

## 2018-02-16 RX ADMIN — Medication SCH MG: at 08:45

## 2018-02-16 RX ADMIN — INSULIN HUMAN SCH UNIT: 100 INJECTION, SOLUTION PARENTERAL at 07:30

## 2018-02-16 RX ADMIN — ASPIRIN SCH MG: 81 TABLET, COATED ORAL at 16:09

## 2018-02-16 RX ADMIN — Medication SCH MG: at 09:00

## 2018-02-16 RX ADMIN — PRAVASTATIN SODIUM SCH MG: 20 TABLET ORAL at 21:34

## 2018-02-16 RX ADMIN — FAMOTIDINE SCH MG: 20 TABLET, FILM COATED ORAL at 09:00

## 2018-02-16 RX ADMIN — NITROGLYCERIN SCH GM: 20 OINTMENT TOPICAL at 16:09

## 2018-02-16 RX ADMIN — INSULIN HUMAN SCH UNIT: 100 INJECTION, SOLUTION PARENTERAL at 11:30

## 2018-02-16 RX ADMIN — NITROGLYCERIN SCH GM: 20 OINTMENT TOPICAL at 21:34

## 2018-02-16 RX ADMIN — CYANOCOBALAMIN SCH MCG: 1000 INJECTION, SOLUTION INTRAMUSCULAR at 08:45

## 2018-02-16 RX ADMIN — SODIUM CHLORIDE SCH MLS/HR: 9 INJECTION, SOLUTION INTRAVENOUS at 05:40

## 2018-02-16 RX ADMIN — FENOFIBRATE SCH MG: 145 TABLET ORAL at 16:09

## 2018-02-16 RX ADMIN — NITROGLYCERIN SCH GM: 20 OINTMENT TOPICAL at 02:36

## 2018-02-16 RX ADMIN — AMLODIPINE BESYLATE SCH MG: 5 TABLET ORAL at 08:45

## 2018-02-16 RX ADMIN — INSULIN HUMAN SCH UNIT: 100 INJECTION, SOLUTION PARENTERAL at 21:18

## 2018-02-16 RX ADMIN — Medication SCH MG: at 16:10

## 2018-02-16 NOTE — OPERATIVE REPORT
DATE OF PROCEDURE:  February 16, 2018 



REFERRING PHYSICIAN:  Dr. Yulia Barone 



PROCEDURE PERFORMED:  Colonoscopy with polypectomy.  



INDICATIONS FOR COLONOSCOPY:  Colon polyps on colonoscopy on 2/15/2018.  

Prep was suboptimal.  Colonoscopy is being carried out to rule out any 

synchronous colorectal neoplasm.



MEDICATION:  Patient was done under MAC.  Please see anesthesiologist's 

note.



PROCEDURE:  With the patient in the lateral decubitus position, a flexible 

fiberoptic Olympus colonoscope was inserted into the rectum with ease and 

advanced all the way to the cecum.  The scope was then withdrawn slowly, 

and 1 polyp was snared from the ascending colon.  The transverse grossly 

appeared to be within normal limits.  Two polyps were snared from the 

descending colon.  Sigmoid and rectum grossly appeared to be within normal 

limits.  The scope was then retroflexed into the distal rectum, and small 

internal hemorrhoids were noted, none of which was actively bleeding.  The 

scope was then straightened out.  The rectosigmoid area as well as the 

distal rectal area were decompressed.  The scope was subsequently 

withdrawn.  Patient tolerated the procedure well.



IMPRESSION  

1. Ascending colon polyp, snared and hemoclipped.

2. Descending colon polyps times 2, snared.

3. Internal hemorrhoids none actively bleeding.





PLAN:  Follow up histology.  Patient will need a followup colonoscopy in 1 

to 2 years pending pathology report of the large polypoid lesion that was 

removed on the previous colonoscopy.







DD:  02/16/2018 13:36

DT:  02/16/2018 14:03

Job#:  [id number] 



cc:YULIA BARONE

## 2018-02-16 NOTE — PROGRESS NOTE
DATE:  February 16, 2018 



TIME:  8:15 a.m.



OVERNIGHT:  No events.  Further endoscopy pending today.



REVIEW OF SYSTEMS:  Denies any dizziness or chest pain.



PHYSICAL EXAMINATION 

VITAL SIGNS:  Reviewed.

GENERAL:  A tired-appearing man resting in bed. 

HEENT:  Anicteric.  

CARDIOVASCULAR:  Normal S1 and S2.  

LUNGS:  Moderate breath sounds.  

ABDOMEN:  Soft and nontender.  

EXTREMITIES:  No edema.

SKIN:  Dry.

PSYCHIATRIC:  Normal affect.



LABS:  Reviewed.



MEDICATIONS:  Reviewed.



ASSESSMENT:  A 72-year-old man with:

1.  Left-sided chest pain.

2.  Diabetes mellitus, type 2:  Hemoglobin A1c 8.6, LDL 75, and 

triglycerides 241.

3.  Hypertension/hyperlipidemia.

4.  Diabetic neuropathy.

5.  Systolic murmur.  



PLAN 

1.  Endoscopy pending today.

2.  Continue regimen of hydrochlorothiazide, fenofibrate, amlodipine, and 

aspirin. 

3.  Glucose well controlled.

4.  Follow up endoscopy results. 









DD:  02/16/2018 08:38

DT:  02/16/2018 09:01

Job#:  [id number]

## 2018-02-17 VITALS — DIASTOLIC BLOOD PRESSURE: 69 MMHG | SYSTOLIC BLOOD PRESSURE: 119 MMHG

## 2018-02-17 VITALS — DIASTOLIC BLOOD PRESSURE: 66 MMHG | SYSTOLIC BLOOD PRESSURE: 129 MMHG

## 2018-02-17 VITALS — DIASTOLIC BLOOD PRESSURE: 65 MMHG | SYSTOLIC BLOOD PRESSURE: 139 MMHG

## 2018-02-17 VITALS — DIASTOLIC BLOOD PRESSURE: 67 MMHG | SYSTOLIC BLOOD PRESSURE: 166 MMHG

## 2018-02-17 LAB
ANION GAP SERPL CALC-SCNC: 13.4 MMOL/L (ref 8–16)
BASOPHILS # BLD AUTO: 0 10*3/UL (ref 0–0.1)
BASOPHILS NFR BLD AUTO: 0.3 % (ref 0–1)
BUN SERPL-MCNC: 9 MG/DL (ref 7–26)
BUN/CREAT SERPL: 11 (ref 6–25)
CALCIUM SERPL-MCNC: 8.6 MG/DL (ref 8.4–10.2)
CHLORIDE SERPL-SCNC: 107 MMOL/L (ref 98–107)
CO2 SERPL-SCNC: 21 MMOL/L (ref 22–29)
DEPRECATED NEUTROPHILS # BLD AUTO: 4.7 10*3/UL (ref 2.1–6.9)
EGFRCR SERPLBLD CKD-EPI 2021: > 60 ML/MIN (ref 60–?)
EOSINOPHIL # BLD AUTO: 0.2 10*3/UL (ref 0–0.4)
EOSINOPHIL NFR BLD AUTO: 2.6 % (ref 0–6)
ERYTHROCYTE [DISTWIDTH] IN CORD BLOOD: 12.6 % (ref 11.7–14.4)
GLUCOSE SERPLBLD-MCNC: 139 MG/DL (ref 74–118)
HCT VFR BLD AUTO: 28.1 % (ref 38.2–49.6)
HGB BLD-MCNC: 10 G/DL (ref 14–18)
LYMPHOCYTES # BLD: 1.4 10*3/UL (ref 1–3.2)
LYMPHOCYTES NFR BLD AUTO: 19.5 % (ref 18–39.1)
MCH RBC QN AUTO: 31.3 PG (ref 28–32)
MCHC RBC AUTO-ENTMCNC: 35.6 G/DL (ref 31–35)
MCV RBC AUTO: 88.1 FL (ref 81–99)
MONOCYTES # BLD AUTO: 0.7 10*3/UL (ref 0.2–0.8)
MONOCYTES NFR BLD AUTO: 9.3 % (ref 4.4–11.3)
NEUTS SEG NFR BLD AUTO: 68 % (ref 38.7–80)
PLATELET # BLD AUTO: 194 X10E3/UL (ref 140–360)
POTASSIUM SERPL-SCNC: 3.4 MMOL/L (ref 3.5–5.1)
RBC # BLD AUTO: 3.19 X10E6/UL (ref 4.3–5.7)
SODIUM SERPL-SCNC: 138 MMOL/L (ref 136–145)

## 2018-02-17 RX ADMIN — SODIUM CHLORIDE SCH MLS/HR: 9 INJECTION, SOLUTION INTRAVENOUS at 06:21

## 2018-02-17 RX ADMIN — AMLODIPINE BESYLATE SCH MG: 5 TABLET ORAL at 08:50

## 2018-02-17 RX ADMIN — ASPIRIN SCH MG: 81 TABLET, COATED ORAL at 08:50

## 2018-02-17 RX ADMIN — INSULIN HUMAN SCH UNIT: 100 INJECTION, SOLUTION PARENTERAL at 07:50

## 2018-02-17 RX ADMIN — INSULIN HUMAN SCH UNIT: 100 INJECTION, SOLUTION PARENTERAL at 11:25

## 2018-02-17 RX ADMIN — NITROGLYCERIN SCH GM: 20 OINTMENT TOPICAL at 08:50

## 2018-02-17 RX ADMIN — FENOFIBRATE SCH MG: 145 TABLET ORAL at 08:50

## 2018-02-17 RX ADMIN — CYANOCOBALAMIN SCH MCG: 1000 INJECTION, SOLUTION INTRAMUSCULAR at 08:50

## 2018-02-17 RX ADMIN — NITROGLYCERIN SCH GM: 20 OINTMENT TOPICAL at 03:25

## 2018-02-17 RX ADMIN — FAMOTIDINE SCH MG: 20 TABLET, FILM COATED ORAL at 08:50

## 2018-02-17 RX ADMIN — Medication SCH MG: at 08:50

## 2018-02-17 NOTE — DISCHARGE SUMMARY
PRINCIPAL DIAGNOSES

1. Gastritis.

2. Colonic polyps, status post resection.

3. left-sided chest pain, status post left heart catheterization with no 

stent placement.

4. Diabetes mellitus type 2.  Hemoglobin A1c 8.6, LDL 75, and 

triglycerides 241.

5. Diabetic neuropathy.

6. Systolic murmur.



SECONDARY DIAGNOSIS:  Diabetes mellitus type 2.



CHIEF COMPLAINT:  Chest pain.



HISTORY OF PRESENT ILLNESS:  This is a 72-year-old man with chest pain.  

Please refer to the H and P for further details.



HOSPITAL COURSE:  Patient admitted with chest pain, underwent left heart 

catheterization.  Stent was not placed.  Recommended for a GI evaluation as 

patient had anemia; so therefore wanted to workup right stent placement.  

Patient underwent endoscopy, showed gastritis and colonic polyps, which 

were resected.  Patient was planned to followup with cardiology in 1 week 

for possible stent placement at that time.  He has diabetes mellitus type 

2.  Hemoglobin A1c is 8.6, LDL 75, and triglycerides 241, diabetic 

neuropathy, and systolic murmur.  Patient is doing better, stable, no chest 

pain at this time, currently appropriate for discharge.



DISCHARGE MEDICATIONS:  Per electronic medical record.





FOLLOWUP:  Primary care doctor in 1 week, cardiology in 1 week for possible 

stent placement, and GI services in 2 weeks.







 _________________________________

LUCILLE PUGH MD



DD:  02/17/2018 12:54

DT:  02/17/2018 13:37

Job#:  [id number] [facility]

## 2018-02-27 LAB
ANION GAP SERPL CALC-SCNC: 13.5 MMOL/L (ref 8–16)
BASOPHILS # BLD AUTO: 0.1 10*3/UL (ref 0–0.1)
BASOPHILS NFR BLD AUTO: 1 % (ref 0–1)
BUN SERPL-MCNC: 15 MG/DL (ref 7–26)
BUN/CREAT SERPL: 15 (ref 6–25)
CALCIUM SERPL-MCNC: 9.5 MG/DL (ref 8.4–10.2)
CHLORIDE SERPL-SCNC: 106 MMOL/L (ref 98–107)
CO2 SERPL-SCNC: 25 MMOL/L (ref 22–29)
DEPRECATED NEUTROPHILS # BLD AUTO: 3 10*3/UL (ref 2.1–6.9)
EGFRCR SERPLBLD CKD-EPI 2021: > 60 ML/MIN (ref 60–?)
EOSINOPHIL # BLD AUTO: 0.3 10*3/UL (ref 0–0.4)
EOSINOPHIL NFR BLD AUTO: 4.5 % (ref 0–6)
ERYTHROCYTE [DISTWIDTH] IN CORD BLOOD: 13 % (ref 11.7–14.4)
GLUCOSE SERPLBLD-MCNC: 129 MG/DL (ref 74–118)
HCT VFR BLD AUTO: 33.8 % (ref 38.2–49.6)
HGB BLD-MCNC: 11.5 G/DL (ref 14–18)
LYMPHOCYTES # BLD: 1.9 10*3/UL (ref 1–3.2)
LYMPHOCYTES NFR BLD AUTO: 33.5 % (ref 18–39.1)
MCH RBC QN AUTO: 30.9 PG (ref 28–32)
MCHC RBC AUTO-ENTMCNC: 34 G/DL (ref 31–35)
MCV RBC AUTO: 90.9 FL (ref 81–99)
MONOCYTES # BLD AUTO: 0.5 10*3/UL (ref 0.2–0.8)
MONOCYTES NFR BLD AUTO: 8.7 % (ref 4.4–11.3)
NEUTS SEG NFR BLD AUTO: 52.1 % (ref 38.7–80)
PLATELET # BLD AUTO: 234 X10E3/UL (ref 140–360)
POTASSIUM SERPL-SCNC: 4.5 MMOL/L (ref 3.5–5.1)
RBC # BLD AUTO: 3.72 X10E6/UL (ref 4.3–5.7)
SODIUM SERPL-SCNC: 140 MMOL/L (ref 136–145)

## 2018-02-28 ENCOUNTER — HOSPITAL ENCOUNTER (OUTPATIENT)
Dept: HOSPITAL 88 - CATH LAB | Age: 73
Setting detail: OBSERVATION
LOS: 1 days | Discharge: HOME | End: 2018-03-01
Attending: INTERNAL MEDICINE | Admitting: INTERNAL MEDICINE
Payer: MEDICARE

## 2018-02-28 VITALS — SYSTOLIC BLOOD PRESSURE: 146 MMHG | DIASTOLIC BLOOD PRESSURE: 70 MMHG

## 2018-02-28 VITALS — HEIGHT: 69 IN | WEIGHT: 185 LBS | BODY MASS INDEX: 27.4 KG/M2

## 2018-02-28 VITALS — SYSTOLIC BLOOD PRESSURE: 129 MMHG | DIASTOLIC BLOOD PRESSURE: 60 MMHG

## 2018-02-28 VITALS — SYSTOLIC BLOOD PRESSURE: 160 MMHG | DIASTOLIC BLOOD PRESSURE: 79 MMHG

## 2018-02-28 VITALS — DIASTOLIC BLOOD PRESSURE: 68 MMHG | SYSTOLIC BLOOD PRESSURE: 143 MMHG

## 2018-02-28 VITALS — DIASTOLIC BLOOD PRESSURE: 60 MMHG | SYSTOLIC BLOOD PRESSURE: 129 MMHG

## 2018-02-28 DIAGNOSIS — D64.9: ICD-10-CM

## 2018-02-28 DIAGNOSIS — I25.10: Primary | ICD-10-CM

## 2018-02-28 DIAGNOSIS — E11.9: ICD-10-CM

## 2018-02-28 DIAGNOSIS — I35.2: ICD-10-CM

## 2018-02-28 DIAGNOSIS — E78.5: ICD-10-CM

## 2018-02-28 DIAGNOSIS — I10: ICD-10-CM

## 2018-02-28 PROCEDURE — 92929: CPT

## 2018-02-28 PROCEDURE — 80048 BASIC METABOLIC PNL TOTAL CA: CPT

## 2018-02-28 PROCEDURE — 85025 COMPLETE CBC W/AUTO DIFF WBC: CPT

## 2018-02-28 PROCEDURE — 36415 COLL VENOUS BLD VENIPUNCTURE: CPT

## 2018-02-28 PROCEDURE — 93005 ELECTROCARDIOGRAM TRACING: CPT

## 2018-02-28 PROCEDURE — 92928 PRQ TCAT PLMT NTRAC ST 1 LES: CPT

## 2018-02-28 PROCEDURE — 82948 REAGENT STRIP/BLOOD GLUCOSE: CPT

## 2018-02-28 PROCEDURE — 36140 INTRO NDL ICATH UPR/LXTR ART: CPT

## 2018-02-28 PROCEDURE — 77002 NEEDLE LOCALIZATION BY XRAY: CPT

## 2018-02-28 RX ADMIN — DEXTROSE AND SODIUM CHLORIDE SCH MLS/HR: 5; 450 INJECTION, SOLUTION INTRAVENOUS at 17:11

## 2018-03-01 VITALS — SYSTOLIC BLOOD PRESSURE: 136 MMHG | DIASTOLIC BLOOD PRESSURE: 75 MMHG

## 2018-03-01 VITALS — SYSTOLIC BLOOD PRESSURE: 141 MMHG | DIASTOLIC BLOOD PRESSURE: 63 MMHG

## 2018-03-01 VITALS — SYSTOLIC BLOOD PRESSURE: 128 MMHG | DIASTOLIC BLOOD PRESSURE: 63 MMHG

## 2018-03-01 LAB
ANION GAP SERPL CALC-SCNC: 11.7 MMOL/L (ref 8–16)
BASOPHILS # BLD AUTO: 0 10*3/UL (ref 0–0.1)
BASOPHILS NFR BLD AUTO: 0.6 % (ref 0–1)
BUN SERPL-MCNC: 15 MG/DL (ref 7–26)
BUN/CREAT SERPL: 17 (ref 6–25)
CALCIUM SERPL-MCNC: 9 MG/DL (ref 8.4–10.2)
CHLORIDE SERPL-SCNC: 108 MMOL/L (ref 98–107)
CO2 SERPL-SCNC: 24 MMOL/L (ref 22–29)
DEPRECATED NEUTROPHILS # BLD AUTO: 3.3 10*3/UL (ref 2.1–6.9)
EGFRCR SERPLBLD CKD-EPI 2021: > 60 ML/MIN (ref 60–?)
EOSINOPHIL # BLD AUTO: 0.1 10*3/UL (ref 0–0.4)
EOSINOPHIL NFR BLD AUTO: 2.5 % (ref 0–6)
ERYTHROCYTE [DISTWIDTH] IN CORD BLOOD: 12.9 % (ref 11.7–14.4)
GLUCOSE SERPLBLD-MCNC: 155 MG/DL (ref 74–118)
HCT VFR BLD AUTO: 28.5 % (ref 38.2–49.6)
HGB BLD-MCNC: 9.9 G/DL (ref 14–18)
LYMPHOCYTES # BLD: 1.4 10*3/UL (ref 1–3.2)
LYMPHOCYTES NFR BLD AUTO: 26.7 % (ref 18–39.1)
MCH RBC QN AUTO: 31 PG (ref 28–32)
MCHC RBC AUTO-ENTMCNC: 34.7 G/DL (ref 31–35)
MCV RBC AUTO: 89.3 FL (ref 81–99)
MONOCYTES # BLD AUTO: 0.4 10*3/UL (ref 0.2–0.8)
MONOCYTES NFR BLD AUTO: 6.9 % (ref 4.4–11.3)
NEUTS SEG NFR BLD AUTO: 63.1 % (ref 38.7–80)
PLATELET # BLD AUTO: 239 X10E3/UL (ref 140–360)
POTASSIUM SERPL-SCNC: 3.7 MMOL/L (ref 3.5–5.1)
RBC # BLD AUTO: 3.19 X10E6/UL (ref 4.3–5.7)
SODIUM SERPL-SCNC: 140 MMOL/L (ref 136–145)

## 2018-03-01 RX ADMIN — DEXTROSE AND SODIUM CHLORIDE SCH MLS/HR: 5; 450 INJECTION, SOLUTION INTRAVENOUS at 01:01

## 2018-03-01 NOTE — OPERATIVE REPORT
DATE OF PROCEDURE:  February 28, 2018 

 

DIAGNOSES:

1. Severe coronary artery disease.

2. Calcific aortic stenosis and aortic insufficiency.

3. Hypertension.

4. Hyperlipidemia.

5. Diabetes mellitus.

6. Mild anemia.



PROCEDURES:

1. Left coronary angiogram.

2. Angioplasty and stenting of the second obtuse marginal branch.

3. Angioplasty and stenting of the distal left anterior descending branch.

4. Angio-Seal closure of the right femoral artery.



ANESTHESIA:  Moderate sedation using 2 mg of Versed and 25 mcg of fentanyl 

as well as local anesthetic to the right inguinal area.



The patient was recently hospitalized with unstable angina pectoris.  He 

also had some development of anemia, which was evaluated by Dr. Olegario White, and the patient now returned for coronary intervention after his 

cardiac catheterization showed severe coronary artery disease mostly 

involving the left coronary artery.



DESCRIPTION OF PROCEDURE:  After the usual prepping and draping, the right 

inguinal area was infiltrated with local lidocaine.  A 6-Thai arterial 

sheath was then inserted in the right femoral artery under modified 

Seldinger technique.  A 6-Thai XB __________ 3.5 was utilized for 

cannulation of the left coronary artery and left coronary angiogram.  A 

0.014 Hi-Torque floppy guidewire was then first inserted into the second 

obtuse marginal branch, and pre-dilatation of this segmental 80% stenosis 

was performed using a 2.5 x 20 Emerge balloon catheter.  This was then 

followed by advancing a 2.75 x 24 Synergy coronary stent, which was well 

positioned and achieved excellent result after being deployed for 45 

seconds at 12 atmospheres.  There was no residual stenosis.



The guidewire was then repositioned in the left anterior descending branch 

__________ 80% distal stenosis.  Direct stenting was performed by advancing 

2.25 x 16 mm Synergy coronary stent, which was inflated at 14 atmospheres 

up to 45 seconds, again there was no residual stenosis.  At the beginning 

of the procedure, the patient was started with Angiomax bolus and 

continuous drip throughout the procedure.  At the end of procedure after 

removal of stent carrier, guidewire, and guiding catheter, angiogram of the 

right inguinal area in the WHITE projection was performed prior to closure of 

the right femoral artery with Angio-Seal, model Evolution.  After adequate 

hemostasis was achieved, dressing was applied, and the patient transferred 

to the observation area.  The patient also received 1 g of Ancef 

intravenously and 600 mg of Plavix and 81 mg of aspirin at the end of the 

procedure.  The procedure was well tolerated.  There were no complications 

and there was no blood loss.  The information was given to the patient and 

the family about the successful angioplasty and stenting of the high-grade 

stenotic areas on the left coronary artery, and he will be observed for 24 

hours in the hospital.  He will continue his home medication.  His 

metformin has been on hold and may be resumed 24 hours after the procedure. 

 He also will continue aspirin 81 mg daily, and Plavix 75 mg daily will be 

added, and he will be followed closely as outpatient.







DD:  03/01/2018 00:08

DT:  03/01/2018 00:43

Job#:  [id number] 





cc:GERMAIN FORD M.D.

## 2019-01-03 ENCOUNTER — HOSPITAL ENCOUNTER (OUTPATIENT)
Dept: HOSPITAL 88 - CATH LAB | Age: 74
Setting detail: OBSERVATION
LOS: 1 days | Discharge: HOME | End: 2019-01-04
Attending: INTERNAL MEDICINE | Admitting: INTERNAL MEDICINE
Payer: MEDICARE

## 2019-01-03 VITALS — SYSTOLIC BLOOD PRESSURE: 148 MMHG | DIASTOLIC BLOOD PRESSURE: 65 MMHG

## 2019-01-03 VITALS — DIASTOLIC BLOOD PRESSURE: 66 MMHG | SYSTOLIC BLOOD PRESSURE: 157 MMHG

## 2019-01-03 VITALS — SYSTOLIC BLOOD PRESSURE: 137 MMHG | DIASTOLIC BLOOD PRESSURE: 65 MMHG

## 2019-01-03 VITALS — DIASTOLIC BLOOD PRESSURE: 70 MMHG | SYSTOLIC BLOOD PRESSURE: 150 MMHG

## 2019-01-03 VITALS — WEIGHT: 193.19 LBS | HEIGHT: 68 IN | BODY MASS INDEX: 29.28 KG/M2

## 2019-01-03 VITALS — SYSTOLIC BLOOD PRESSURE: 154 MMHG | DIASTOLIC BLOOD PRESSURE: 66 MMHG

## 2019-01-03 VITALS — DIASTOLIC BLOOD PRESSURE: 70 MMHG | SYSTOLIC BLOOD PRESSURE: 147 MMHG

## 2019-01-03 VITALS — SYSTOLIC BLOOD PRESSURE: 158 MMHG | DIASTOLIC BLOOD PRESSURE: 72 MMHG

## 2019-01-03 VITALS — DIASTOLIC BLOOD PRESSURE: 65 MMHG | SYSTOLIC BLOOD PRESSURE: 148 MMHG

## 2019-01-03 VITALS — DIASTOLIC BLOOD PRESSURE: 84 MMHG | SYSTOLIC BLOOD PRESSURE: 179 MMHG

## 2019-01-03 VITALS — SYSTOLIC BLOOD PRESSURE: 153 MMHG | DIASTOLIC BLOOD PRESSURE: 70 MMHG

## 2019-01-03 DIAGNOSIS — I70.213: ICD-10-CM

## 2019-01-03 DIAGNOSIS — Z79.84: ICD-10-CM

## 2019-01-03 DIAGNOSIS — E11.9: ICD-10-CM

## 2019-01-03 DIAGNOSIS — I11.9: ICD-10-CM

## 2019-01-03 DIAGNOSIS — Z95.5: ICD-10-CM

## 2019-01-03 DIAGNOSIS — I25.119: Primary | ICD-10-CM

## 2019-01-03 PROCEDURE — 36247 INS CATH ABD/L-EXT ART 3RD: CPT

## 2019-01-03 PROCEDURE — 75716 ARTERY X-RAYS ARMS/LEGS: CPT

## 2019-01-03 PROCEDURE — 93454 CORONARY ARTERY ANGIO S&I: CPT

## 2019-01-03 PROCEDURE — 36415 COLL VENOUS BLD VENIPUNCTURE: CPT

## 2019-01-03 PROCEDURE — 75625 CONTRAST EXAM ABDOMINL AORTA: CPT

## 2019-01-03 PROCEDURE — 82948 REAGENT STRIP/BLOOD GLUCOSE: CPT

## 2019-01-03 PROCEDURE — 92928 PRQ TCAT PLMT NTRAC ST 1 LES: CPT

## 2019-01-03 NOTE — NUR
Left groin arterial and venous sheaths pulled.  Manual pressure held for 20 minutes.  No 
bleeding or hematoma noted.  Patient to admit to room 184.  Telemetry obtained and waiting 
to call report to floor.  Nurse for patient unavailable at this time.

## 2019-01-03 NOTE — NUR
AMBULATED TO UNIT. PREPROCEDURE TEACHING PROVIDED. PREPPED PER PROCEDURE PROTOCOL. AAOX3, 
VSS. DENIED PAIN OR NEEDS AT THIS TIME. BED IN LOWEST POSITION, SIDE RAILS UP, CALL LIGHT 
WITHIN REACH. FAMILY AT BEDSIDE.

## 2019-01-03 NOTE — NUR
Received patient to [facility] holding room 10.  Patient s/p C with PCI and peripheral angio.  
Patient a&o x 3.  Vital signs stable.  Family brought to bedside.  Dr. Walker discussing 
plan with patient and family.  Requesting food tray for patient.

## 2019-01-03 NOTE — NUR
Erik RT at bedside.  Femoral sheath pulled from left groin and manual pressure being 
held.  Patient tolerating well.

## 2019-01-03 NOTE — NUR
patient received from ER via stretcher. left groin access sites dry and intact with no 
bleeding or hematoma. pedal pulses present. family at BS. vitals stable with no distress.

## 2019-01-03 NOTE — NUR
Patient to admit to room 184.  Report called to ADRIANO Retana.  Patient transported to floor via 
stretcher.

## 2019-01-04 VITALS — DIASTOLIC BLOOD PRESSURE: 61 MMHG | SYSTOLIC BLOOD PRESSURE: 136 MMHG

## 2019-01-04 VITALS — SYSTOLIC BLOOD PRESSURE: 133 MMHG | DIASTOLIC BLOOD PRESSURE: 62 MMHG

## 2019-01-04 VITALS — DIASTOLIC BLOOD PRESSURE: 67 MMHG | SYSTOLIC BLOOD PRESSURE: 144 MMHG

## 2019-01-04 VITALS — SYSTOLIC BLOOD PRESSURE: 136 MMHG | DIASTOLIC BLOOD PRESSURE: 61 MMHG

## 2019-01-04 VITALS — DIASTOLIC BLOOD PRESSURE: 77 MMHG | SYSTOLIC BLOOD PRESSURE: 148 MMHG

## 2019-01-04 NOTE — NUR
SOCIAL WORK INITIAL ASSESSMENT 

 to bedside to discuss plan of care with patient/family. CM/SW role and care 
transitions discussed. Anticipated discharge plan discussed along with duration of care. 
CM/SW discussed patients right to make decisions in care.  CM/SW work hours given.  

Patient lives: IN OWN HOUSE WITH FAMILY

Admit/Transfer: VIA HOME

POA/Emergency contact: WIFE VIRGILIO JOSUE [phone]

Current/Previous Home Health: NONE

PCP/Follow-up Care: REYNOLDO

Current/Previous DME: NONE

Other Services: NONE

Employment Status: RETIRED

Areas of Concerns: NONE

Referral Needs: NONE

Education Needs: NONE

IMM/MOON given and signed (if applicable): DINH

Goal for discharge: RETURN HOME INDEPENDENTLY 

CM/SW left business card at the bedside with contact information. Name and number was also 
written on the patients whiteboard. Patient verbalized understanding of discussion. CM will 
follow-up with ongoing discharge and transition of care needs.

## 2019-01-04 NOTE — NUR
WALKING ROUNDS DONE AND REPORT RECEIVED. PT IS AWAKE AND ALERT WITHOUT ANY COMPLAINTS 
VOICED. LEFT GROIN CATH SITE INTACT. POC DISCUSSED. PT INSTRUCTED TO CALL FOR ASSISTANCE AS 
NEEDED AND VERBALIZED UNDERSTANDING. CALL BELL WITHIN REACH.

## 2019-01-04 NOTE — OPERATIVE REPORT
DATE OF PROCEDURE:  January 03, 2019 

 

PREOPERATIVE DIAGNOSES 

1.  Coronary artery disease with history of cardiac stenting and recurrent 

angina pectoris.  

2.  Claudication of both lower extremities with severe peripheral vascular 

disease as seen on arterial ultrasound study.  

3.  Hypertensive cardiovascular disease.

4.  Diabetes mellitus.  



PROCEDURES 

1.  Coronary angiography and stenting of one of the patient's obtuse 

marginal branches, which showed severe stenosis distal to the patent's 

previously placed stent. 

2.  Abdominal and pelvic angiography.

3.  Selective bilateral iliofemoral angiography.  



ANESTHESIA:  Local anesthetic to the left groin area and moderate sedation 

with 1 mg of Versed and 25 mcg of fentanyl.



DESCRIPTION OF PROCEDURE:  After usual prepping and draping, the left 

inguinal area was infiltrated with local lidocaine.  The left femoral vein 

was punctured percutaneously and a guidewire was placed.  Later on, a 

5-Burkinan venous sheath was inserted prior to the intervention.  The left 

femoral artery was also punctured percutaneously, and under modified 

Seldinger technique a 6-Burkinan arterial sheath was placed.  Initially, a 

6-Burkinan angled pigtail catheter was inserted and placed at the renal level 

for abdominal and pelvic angiogram.  At this point, diagnostic selective 

coronary angiography of the left and right coronary artery was performed 

using the modified Shanna catheter.  The patient is known to have 

moderately severe calcific aortic stenosis, which was recently documented 

again by echocardiogram, and therefore, no intention was made to enter the 

patient's left ventricle or obtain a left ventricular angiogram.  The 

diagnostic coronary angiography was reviewed, and showed high-grade 

stenosis focally distal to the patent obtuse marginal stent while the 

remainder of the coronary tree on the left coronary artery did not show any 

significant stenosis.  The stent in the distal LAD was fully patent.  The 

right coronary artery showed mild proximal stenosis and some mild plaque in 

the midright coronary artery giving off patent posterior descending branch. 

 The left circumflex artery was quite prominent and giving off the 

posterolateral branches, which were not supplied by the right coronary 

artery.  Further diagnostic studies were then performed by performing a 

selective right iliofemoral arteriogram after using a 5-Burkinan Omni Flush 

catheter, which was advanced over a 0.035 Wooly catheter at the location of 

the right external iliac artery.  The Omni Flush catheter was then removed, 

and the 5-Burkinan 3DRC catheter, which has been utilized for selective right 

coronary angiography was then inserted into the iliac artery on the left 

side for left iliofemoral angiogram.  The findings of the abdominal 

aortography with runoff showed no significant stenosis in the renal 

arteries, superior mesenteric artery, and the abdominal aorta also showed 

only some mild plaque formation.  There was no aneurysm or dissection.  The 

iliac arteries were free of disease.  There was slight tortuosity in the 

external iliac arteries bilaterally.  The internal iliac arteries also 

showed no significant stenosis.  The right femoral artery showed several 

areas of 30% to 40% focal stenotic areas.  There was also focal stenosis in 

the popliteal artery with some haziness.  However, there was no restriction 

to flow.  However, the tibioperoneal artery showed severe disease with the 

anterior and posterior tibial artery being totally occluded.  Also, the 

tibioperoneal trunk was occluded and only filled through collaterals.  The 

peroneal artery was patent showing some significant stenotic areas, but 

connecting to the dorsalis pedis artery, which also had a 99% stenosis at 

the midsegment of the dorsum of the right foot.  The left iliofemoral 

artery showed no significant stenosis in the superficial, femoral and 

profunda branch except just above the knee and involving the popliteal 

artery showing about 40% to 50% stenotic areas.  Both superficial femoral 

arteries showed extensive calcification.  The patient's anterior tibial 

artery showed a 98% significant proximal stenosis and about 80% distal 

stenosis extending into the dorsalis pedis artery.  The posterior tibial 

artery was totally occluded.  The peroneal artery also showed evidence of 

total occlusion, and some segment of the peroneal artery was filled through 

collaterals only.  However, this foot showed good appearance applied to the 

plantar portion of the right and left foot.  Because of the complexity of 

the tibioperoneal disease showing mainly single runoff and mostly 

collateral flow, no intervention was considered to treat the patient's 

claudication and severe tibioperoneal disease at this study.  



Attention was then directed to the stenotic area involving the obtuse 

marginal branch and the risk of occlusion at this significant stenosis, and 

the risk of also occluding the presently patent stent in the obtuse 

marginal branch.  Intervention was then directed to this area.  A 6-Burkinan 

XB 3.5 guiding catheter was utilized, which gave excellent support which 

was needed because of the short left main and the 90-degree takeoff of the 

left circumflex trunk, which however, was traversed using a 0.014 Whisper 

guidewire, which was also directed into the distal portion of the obtuse 

marginal branch.  Direct stenting was performed using a 2.75 x 12 Synergy 

coronary stent by Jeeves.  The stent was dilated up to 10 

atmospheres for 45 seconds, and post dilatation was then performed using a 

2.75 x 20 Emerge balloon catheter, which was also inflated up to 10 

atmospheres for 30 seconds.  Excellent result was achieved.  There was no 

residual stenosis.  The patient was given Angio-Max bolus and drip 

throughout the intervention.  After the final angiogram was obtained and 

removal of the post dilatation balloon catheter and guidewire, the patient 

was given 300 mg of Plavix and 325 mg of aspirin.  The venous and arterial 

sheaths were then secured with 2-0 silk sutures.  Dressing was applied and 

the patient transferred to the observation area.  Removal of both sheaths 

will be down using the Sonia hemostatic patch between 2 and 3 hours after 

discontinuation of Angio-Max.  The patient will be hospitalized for 

observation and hopefully discharged on January 4, 2019, was discussed with 

the him and the family.  The procedure was well tolerated.  There were no 

complications.  



FINAL IMPRESSION

1.  Coronary disease with patent coronary stent, but high-grade stenosis 

distal to the obtuse marginal stent was treated successfully with extension 

of the stent and placing of new coronary stent.

2.  Severe peripheral vascular disease mostly involving the tibioperoneal 

arteries of both lower extremities.  



The patient's peripheral vascular disease is precarious.  However, it is 

also challenging as far as treatment is concerned.  At this point, 

preferably will be treated medically except if the patient will develop 

evidence of severe arterial ischemia with increased ischemic pain, evidence 

of onset 

of gangrene or infection involving both lower extremities.  This was 

discussed in detail with the patient and the family.  













DD:  01/04/2019 00:27

DT:  01/04/2019 03:37

Job#:  [id number]



cc:GERMAIN FORD M.D.

## 2019-01-04 NOTE — NUR
Patient discharged home with written instructions. Patient verbalized understanding. IV 
removed earlier without any bleeding or hematoma. Patient wheeled to personal vehicle with 
all belongings in stable condition and family at side.

## 2020-07-08 ENCOUNTER — HOSPITAL ENCOUNTER (OUTPATIENT)
Dept: HOSPITAL 88 - CATH LAB | Age: 75
Discharge: HOME | End: 2020-07-08
Attending: INTERNAL MEDICINE
Payer: MEDICARE

## 2020-07-08 VITALS — DIASTOLIC BLOOD PRESSURE: 66 MMHG | SYSTOLIC BLOOD PRESSURE: 121 MMHG

## 2020-07-08 VITALS — SYSTOLIC BLOOD PRESSURE: 121 MMHG | DIASTOLIC BLOOD PRESSURE: 66 MMHG

## 2020-07-08 VITALS — BODY MASS INDEX: 27.85 KG/M2 | WEIGHT: 188 LBS | HEIGHT: 69 IN

## 2020-07-08 VITALS — SYSTOLIC BLOOD PRESSURE: 112 MMHG | DIASTOLIC BLOOD PRESSURE: 68 MMHG

## 2020-07-08 VITALS — DIASTOLIC BLOOD PRESSURE: 63 MMHG | SYSTOLIC BLOOD PRESSURE: 121 MMHG

## 2020-07-08 VITALS — DIASTOLIC BLOOD PRESSURE: 59 MMHG | SYSTOLIC BLOOD PRESSURE: 123 MMHG

## 2020-07-08 VITALS — DIASTOLIC BLOOD PRESSURE: 58 MMHG | SYSTOLIC BLOOD PRESSURE: 136 MMHG

## 2020-07-08 VITALS — SYSTOLIC BLOOD PRESSURE: 111 MMHG | DIASTOLIC BLOOD PRESSURE: 77 MMHG

## 2020-07-08 VITALS — DIASTOLIC BLOOD PRESSURE: 67 MMHG | SYSTOLIC BLOOD PRESSURE: 126 MMHG

## 2020-07-08 VITALS — SYSTOLIC BLOOD PRESSURE: 140 MMHG | DIASTOLIC BLOOD PRESSURE: 70 MMHG

## 2020-07-08 VITALS — SYSTOLIC BLOOD PRESSURE: 123 MMHG | DIASTOLIC BLOOD PRESSURE: 53 MMHG

## 2020-07-08 VITALS — SYSTOLIC BLOOD PRESSURE: 139 MMHG | DIASTOLIC BLOOD PRESSURE: 77 MMHG

## 2020-07-08 VITALS — SYSTOLIC BLOOD PRESSURE: 119 MMHG | DIASTOLIC BLOOD PRESSURE: 63 MMHG

## 2020-07-08 VITALS — DIASTOLIC BLOOD PRESSURE: 66 MMHG | SYSTOLIC BLOOD PRESSURE: 122 MMHG

## 2020-07-08 VITALS — DIASTOLIC BLOOD PRESSURE: 59 MMHG | SYSTOLIC BLOOD PRESSURE: 109 MMHG

## 2020-07-08 VITALS — SYSTOLIC BLOOD PRESSURE: 111 MMHG | DIASTOLIC BLOOD PRESSURE: 66 MMHG

## 2020-07-08 DIAGNOSIS — Z95.5: ICD-10-CM

## 2020-07-08 DIAGNOSIS — Z79.84: ICD-10-CM

## 2020-07-08 DIAGNOSIS — I77.1: ICD-10-CM

## 2020-07-08 DIAGNOSIS — Z79.02: ICD-10-CM

## 2020-07-08 DIAGNOSIS — I25.110: Primary | ICD-10-CM

## 2020-07-08 DIAGNOSIS — Z11.59: ICD-10-CM

## 2020-07-08 DIAGNOSIS — I73.9: ICD-10-CM

## 2020-07-08 DIAGNOSIS — R94.39: ICD-10-CM

## 2020-07-08 DIAGNOSIS — I70.0: ICD-10-CM

## 2020-07-08 DIAGNOSIS — E11.9: ICD-10-CM

## 2020-07-08 DIAGNOSIS — I35.2: ICD-10-CM

## 2020-07-08 DIAGNOSIS — I10: ICD-10-CM

## 2020-07-08 DIAGNOSIS — Z01.812: ICD-10-CM

## 2020-07-08 PROCEDURE — 36200 PLACE CATHETER IN AORTA: CPT

## 2020-07-08 PROCEDURE — 93454 CORONARY ARTERY ANGIO S&I: CPT

## 2020-07-08 PROCEDURE — 87635 SARS-COV-2 COVID-19 AMP PRB: CPT

## 2020-07-08 PROCEDURE — 75625 CONTRAST EXAM ABDOMINL AORTA: CPT

## 2020-07-08 PROCEDURE — 93567 NJX CAR CTH SPRVLV AORTGRPHY: CPT

## 2020-07-08 PROCEDURE — 99152 MOD SED SAME PHYS/QHP 5/>YRS: CPT

## 2020-07-08 PROCEDURE — 99153 MOD SED SAME PHYS/QHP EA: CPT

## 2020-07-08 NOTE — NUR
0937g received phone call Dr Walker office, arrival time MD at 1015am reported to [place] 
pt currently loaded on cath table family in waiting area. bipin/rn

## 2020-07-08 NOTE — NUR
1146 amRECEIVING NOTE CATH LAB RECOVERY 
DEPT...............................................................





Bedside report received from ADRIANO Etienne. Identifierx2. Alert oriented and appropriate, 
PERRLA, respirations even and unlabored to room air. Pulses x4 extremities equal  Pedal 
pulses PT/DP X4 Doppler and marked. Cap fill brisk < 3 sec. Left groin manual pull No gross 
issues pain,pallor,pressure or dysrhythmia. Needs probable surgical intervention as 
explained at BS by Dr Walker to wife. Needs f/o at Md office for further discussion



Skin warm and dry integrity appears D/I IV 20g to left hand,  presents healthy w/o s/s of 
infiltration or complaint. Abdomen soft and supple. pt offered toileting, denies need to 
urinate or defecate. No personal affects with patient.  Family wife at bedside. Pt and 
family verbalizes understanding of POC. CD and diagram given to wife to give to consultant.



Currently w/o complaint of pain or need. ds/rn

## 2020-07-08 NOTE — NUR
1730pm CATH LAB RECOVERY  DISCHARGE NURSING 
NOTE--------------------------------------------------------



Pt meets DC criteria. Left groin site assessed for s/s of complication and presence of 
hematoma. Skin warm, dry, no discolor, and pulses present. IV removed from left hand . 
Distal tip appears intact. VS WNL. Pt denies pain, sob, or need at this time. Family at BS. 
Review of discharge paperwork and follow up instructions. verbalized understanding. Pt to 
wheelchair and transported to front of hospital. Transferred to private vehicle under own 
strength w/o incident with DC paperwork in hand. - bipin/rn

## 2020-07-08 NOTE — NUR
0950a handoff to Scotty Etienne to give pre-ops NO active Cp or SOB family in waiting area 
(Wife)bipin/rn

## 2020-07-08 NOTE — NUR
0930am PREP NOTE PROCEDURAL 
..................................................................................

pt in rm#9, prepped for procedure. Identifierx2,Alert oriented and appropriate, PERRLA, 
respirations even and unlabored to room air. Pulses x4 extremities present. Pedal pulses 
PT/DP x3 palpable Rt Pt Doppler and all marked. Cap fill brisk < 3 sec. bilateral feet  warm 
and dry. [facility] ,femoral approach. Stick time 10:15am.



Skin warm and dry integrity appears intact in general. IV left hand started and  presents 
healthy #20 by Renata Etienne.w/o s/s of infiltration or complaint. Abdomen soft and supple. pt 
offered toileting, denies need to urinate or defecate. Personal affects with patient.  
Family at bedside.Wife Darya  Pt and family verbalizes understanding of POC. bipin/rn

## 2020-07-09 NOTE — OPERATIVE REPORT
DATE OF PROCEDURE:  07/08/2020

 

SURGEON:  Stan Ramirez MD

 

Cardiac Catheterization

 

DIAGNOSES:  

1. Unstable angina pectoris.

2. Coronary artery disease, previous stenting of the obtuse marginal branch of the left

coronary artery. 

3. Calcific aortic stenosis and insufficiency.

4. Hypertension.

5. Diabetes mellitus.

6. Peripheral arterial disease.

 

PROCEDURES:  

1. Coronary angiography.

2. Thoracic aortogram.

3. Abdominal aortography.

4. Left groin angiogram.

 

INDICATION:  This 75-year-old patient was recently evaluated for progressive and

recurrent angina pectoris, which usually is helped with sublingual nitroglycerin.

However, despite the readjustment of his medical management, he continues to have mostly

exertional, but also resting angina pectoris, and he is now admitted for cardiac

catheterization. 

 

DESCRIPTION OF PROCEDURE:  After the usual prepping and draping, the left inguinal area

was infiltrated with local lidocaine.  A 6-British arterial sheath was placed in the left

femoral artery after coronary angiography of the left coronary artery 6-British short

arterial sheath was replaced with long anterior sheaths because of significant

tortuosity in the left external iliac artery.  The procedure was then continued by

selective angiography of the right coronary artery.  A 6-British angled pigtail catheter

was then inserted and positioned above the aortic valve and angiogram in the left

anterior oblique position was performed.  Thereafter, attempt was made to cross the

aortic valve initially using a multipurpose diagnostic catheter and 0.035 angled

Glidewire.  However, since the seen on the aortogram, the entrance into the left

ventricle was very distorted and eccentric.  Therefore, the multipurpose catheter was

exchanged to a right coronary Amplatz catheter and 0.035 Wholey guidewire were utilized.

 However, again the guidewire was unable to be advanced into the left ventricle across

the aortic stenosis.  The procedure was then terminated and the angiogram of the left

inguinal area was performed.  However, the angiogram showed that the insertion of the

sheath was too close to the profunda branch and therefore manual compression was applied

to achieve hemostasis followed by applying of dressing at the left inguinal area and

transferring the patient to the observation area.  With the attempt to discharge the

patient after completion of his bedrest.  Also I need to be mentioned that abdominal

aortogram was performed after the thoracic aortogram because of the patient's malignant

hypertension to rule out any renal artery stenosis and also to make sure the patient

does not have any significant disease involving the infrarenal and the iliac arteries. 

 

Finding of the coronary angiography reveals that the left main coronary artery was

short, but normal.  The proximal LAD showed no stenosis.  There were two diagonal

branches of medium size.  However, they covered a large area of the lateral wall.  There

also was a 3rd diagonal branch, which was quite small and as in the past showed 99%

ostial stenosis.  Just distal to the takeoff of this very small branch, there was some

irregularity and eccentric 50% narrowing of the LAD mainly showing a focal stenosis.

There also was a septal perforated originating at the same area.  The left circumflex

obtuse marginal system was quite large.  One of the branches has coronary stent which

was fully patent.  The more superior branch showed about a 40% narrowing in the mid

portion of this obtuse marginal branch.  The right coronary artery showed some proximal

30% segmental stenosis with some minor plaques in the mid and distal right coronary

artery, there was about 20% stenosis in the midportion of the posterior descending

branch of the right coronary artery.  Since the left circumflex and obtuse marginal

system was quite large and showing areas to the posterior lateral wall of the heart, the

posterolateral system involving from the right coronary artery was quite diminutive. 

 

The thoracic aortogram showed a heavily calcified aortic valve with an eccentric opening

and showing 1 to 2+ aortic insufficiency.  It was noted that the conscious cleared

within one or two contraction of the left ventricle indicating that the aortic

insufficiency was not more than 1 or 2+.  Abdominal aortogram showed some minor plaques

in the infrarenal abdominal aorta.  There was no aneurysm, no dissection.  The external

iliac arteries were somewhat tortuous, but no stenosis was identified as was no stenosis

noted in the proximal left superficial femoral artery and the profunda branch.  The

patient's renal arteries, superior mesenteric artery and takeoff of the celiac trunk

also appeared to be normal. 

 

IMPRESSION:  

1. Coronary artery disease of mild-to-severe stenotic area, considering the 99% ostial

stenosis of the 3rd very small diagonal branch is being quite severe, while the LAD

obstruction is more of moderate degree. 

2. Calcific aortic stenosis as recognized by the preceding transthoracic echocardiogram

and mild aortic insufficiency. 

 

The patient's aortic stenosis has progressed quite significantly recognized on an

echocardiogram in December when the patient was asymptomatic and now again identified on

very recent echocardiogram when the patient developed his symptoms of exertional angina

pectoris.  He also shows coronary artery disease and to further identify the

significance of the focal LAD lesion as a contribution to the patient's symptoms, I

recommended the patient should undergo a pharmacological stress test prior to know the

importance of intervention to correct the patient's aortic valvular disease and this

will be discussed in more detail with the patient and the patient's wife after the

pharmacological stress test and this was also discussed with the patient and his wife

after completion of the cardiac catheterization.  In the meantime, the patient will

continue his regular home medication and will be followed closely. 

 

 

 

 

______________________________

Stan Ramirez MD

 

[facility]

D:  07/09/2020 01:15:53

T:  07/09/2020 04:50:48

Job #:  [id number]

 

cc:            Jose Washington MD

## 2020-07-10 ENCOUNTER — HOSPITAL ENCOUNTER (OUTPATIENT)
Dept: HOSPITAL 88 - NM | Age: 75
End: 2020-07-10
Attending: INTERNAL MEDICINE
Payer: MEDICARE

## 2020-07-10 DIAGNOSIS — R06.02: Primary | ICD-10-CM

## 2020-07-10 PROCEDURE — 93017 CV STRESS TEST TRACING ONLY: CPT

## 2020-07-10 PROCEDURE — 71046 X-RAY EXAM CHEST 2 VIEWS: CPT

## 2020-07-10 PROCEDURE — 78452 HT MUSCLE IMAGE SPECT MULT: CPT

## 2020-07-10 NOTE — DIAGNOSTIC IMAGING REPORT
X-ray chest PA and lateral



History: Shortness of breath



Comparison: 2/11/2018



Findings: Central airways unremarkable. Heart size borderline normal. No

pleural effusion. No pneumothorax. No focal lung disease or nodules. Visualized

skeletal structures is possibility of an old healed right lateral fourth rib

fracture. Upper abdomen is unremarkable. Incidentally noted is a coronary

artery stent.



Impression: No acute cardiopulmonary disease.



Signed by: Prosper Davis MD on 7/10/2020 9:54 AM

## 2020-07-13 NOTE — MYOVIEW STRESS TEST
DATE OF STUDY:  07/10/2020 09:28:00

 

Stress Test - Treadmill ONLY

 

PROCEDURE:  Nuclear gated myocardial perfusion scan. 

 

Thank you, Dr. Stan Ramirez, for the nuclear stress test interpretation consultation.

 

REPORT:  Nuclear gated myocardial perfusion scan performed as per protocol at [facility].  The stress test is supervised by Dr. Stan Ramirez.  I did only

the nuclear stress interpretation.  Lexiscan injected 0.4 mg intravenous stress agent.

Myoview injected 10 millicuries for resting protocol and 30 millicuries stress protocol. 

 

IMPRESSION:  

1. Inferior wall scar noted.

2. No ischemia noted.

3. Left ventricular ejection fraction is 58%. 

Thank you, Dr. Stan Ramirez for this nuclear interpretation consultation.

 

 

 

______________________________

MD BINTA Edouard/[facility]

D:  07/13/2020 17:09:04

T:  07/13/2020 19:42:58

Job #:  [id number]

## 2020-07-20 ENCOUNTER — HOSPITAL ENCOUNTER (OUTPATIENT)
Dept: HOSPITAL 88 - CT | Age: 75
End: 2020-07-20
Attending: INTERNAL MEDICINE
Payer: MEDICARE

## 2020-07-20 DIAGNOSIS — M25.552: ICD-10-CM

## 2020-07-20 DIAGNOSIS — M25.551: ICD-10-CM

## 2020-07-20 DIAGNOSIS — M54.5: Primary | ICD-10-CM

## 2020-07-20 PROCEDURE — 72131 CT LUMBAR SPINE W/O DYE: CPT

## 2020-07-20 NOTE — DIAGNOSTIC IMAGING REPORT
CT LUMBAR SPINE WO



HISTORY: Bilateral hip pain



COMPARISON:  Chest radiograph 7/10/2020



TECHNIQUE: 

Axial CT images of the lumbar spine were obtained without contrast.  Coronal

and sagittal reconstructions obtained from the axial data.  One or more of the

following dose reduction techniques were used: Automated exposure control,

adjustment of the mA and/or kV according to patient size, and/or utilization of

iterative reconstruction technique.



DISCUSSION:

   

There are 5 nonrib-bearing lumbar vertebral bodies.

Lumbar lordosis is preserved.

There is no significant scoliosis or subluxation.

No fracture, compression deformity, or destructive osseous lesion is seen.

No gross spinal canal mass is seen.

The paravertebral and paraspinal soft tissues are unremarkable.



Mild to moderate multilevel spondylosis is superimposed on a congenitally

narrow lumbar spinal canal.



L1-L2: No gross canal or foraminal stenosis.



L2-L3: No gross canal or foraminal stenosis.



L3-L4: Possible mild canal stenosis due to disc bulge and ligamentum flavum

thickening. Mild bilateral foraminal stenoses due to disc bulge and facet

arthrosis. 



L4-L5: Suspected moderate to severe canal stenosis due to disc bulge and

ligamentum flavum thickening. Mild to moderate bilateral foraminal stenoses due

to disc bulge and facet arthrosis.



L5-S1: Moderate right and mild to moderate left foraminal stenoses due to disc

bulge and facet arthrosis. No gross canal stenosis.



Aortoiliac calcified atherosclerosis is present.

 

IMPRESSION:

1.  No acute osseous abnormalities.

2.  Mild to moderate multilevel spondylosis superimposed on a congenitally

narrow lumbar spinal canal.

3.  Suspected moderate to severe degenerative/congenital canal stenosis at

L4-L5.

4.  Multilevel bilateral degenerative foraminal stenoses - moderate on the

right at L5-S1.



Findings can be further evaluated with lumbar spine MRI if clinically feasible.



Signed by: Dr. Ozzy Ram M.D. on 7/20/2020 1:27 PM

## 2020-07-20 NOTE — DIAGNOSTIC IMAGING REPORT
EXAM: CT right hip WITHOUT contrast  . CT scan left hip without contrast.

INDICATION: Right hip pain. Left hip pain. Decreased range of motion. 

Diabetes. Hypertension. 

COMPARISON: None.

TECHNIQUE: Right hip and left hip was scanned utilizing a multidetector helical

scanner without administration of IV contrast. Coronal and sagittal

reformations were obtained. Routine protocol was performed. 

     IV CONTRAST: None

     ORAL CONTRAST: Water

            

COMPLICATIONS: None



RADIATION DOSE:

     Total DLP: 1016.64 mGy*cm

     Estimated effective dose: (DLP x 0.015 x size factor) mSv

     CTDIvol has been reviewed. It is below the limits set by the Radiation

Protocol Committee (RPC).

     Dose modulation, iterative reconstruction, and/or weight based adjustment

of the mA/kV was utilized to reduce the radiation dose to as low as reasonably

achievable. 



FINDINGS:



Scattered vascular calcification.



No acute fracture, subluxation or avascular necrosis. Small bone island in the

left femoral head and left acetabulum.



Scattered degenerative arthrosis about the hips. No osseous erosion.

Physiologic amount of fluid in the hip joints.



Prominent heterogeneous prostate gland. Correlate with laboratory analysis.



Small fat-containing inguinal hernias.



No lymphadenopathy.



The visualized muscles are normal in size and morphology.



Impression:

Scattered degenerative arthrosis about the right hip. No osseous erosion.



Scattered degenerative arthrosis about the left hip. No osseous erosion.



Signed by: Dr. Julio Cesar Rucker M.D. on 7/20/2020 3:36 PM

## 2020-08-31 ENCOUNTER — HOSPITAL ENCOUNTER (OUTPATIENT)
Dept: HOSPITAL 88 - US | Age: 75
End: 2020-08-31
Attending: INTERNAL MEDICINE
Payer: MEDICARE

## 2020-08-31 DIAGNOSIS — R10.10: Primary | ICD-10-CM

## 2020-08-31 PROCEDURE — 76705 ECHO EXAM OF ABDOMEN: CPT

## 2020-08-31 NOTE — DIAGNOSTIC IMAGING REPORT
EXAM: US GALLBLADDER

DATE: 8/31/2020 8:56 AM  

INDICATION: 

^[id number]

^[id number]

^UPPER ABD PAIN

COMPARISON: None 

TECHNIQUE: Transverse and longitudinal gray scale and color doppler sonographic

images of the right upper quadrant were obtained. 



FINDINGS: 

    

LIVER

18.5 cm in the right midclavicular line.

Liver is mildly echogenic with normal contour, no masses.



GALLBLADDER

No gallbladder wall thickening, distension, stone, or pericholecystic fluid.

Negative reported sonographic Perla's sign.



BILE DUCTS

No intra nor extra-hepatic biliary dilation.

Common bile duct measures 0.3cm



PANCREAS: Visualized portions are normal.



RIGHT KIDNEY: 10.4 cm

Echogenicity: Normal

Collecting System:  No hydronephrosis

Stones:  None

Cyst/Mass: There is a 3.3 cm anechoic cyst within the superior pole.





VESSELS:

Aorta: Visualized portions are within normal size limits

Inferior Vena Cava: Visualized portions are normal

Main Portal Vein: 1.2 cm, normal size with hepatopetal flow.



FREE FLUID: None



IMPRESSION:



1. Negative for cholelithiasis or biliary dilatation.

2. Hepatomegaly with mildly increased echogenicity can be seen in patients with

hepatic steatosis.



 



Signed by: Gal Almazan MD on 8/31/2020 10:03 AM

## 2020-09-01 ENCOUNTER — HOSPITAL ENCOUNTER (EMERGENCY)
Dept: HOSPITAL 88 - ER | Age: 75
Discharge: HOME | End: 2020-09-01
Payer: MEDICARE

## 2020-09-01 VITALS — BODY MASS INDEX: 27.85 KG/M2 | WEIGHT: 188 LBS | HEIGHT: 69 IN
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
## 2020-09-01 DIAGNOSIS — E11.9: ICD-10-CM

## 2020-09-01 DIAGNOSIS — S90.121A: Primary | ICD-10-CM

## 2020-09-01 DIAGNOSIS — I10: ICD-10-CM

## 2020-09-01 PROCEDURE — 99283 EMERGENCY DEPT VISIT LOW MDM: CPT

## 2020-09-01 NOTE — XMS REPORT
Continuity of Care Document

                             Created on: 2020



VICKI JOSUE

External Reference #: [id number]

: 1945

Sex: Male



Demographics





                          Address                   [address] [place]  [address]

 

                          Home Phone                [phone]

 

                          Preferred Language        Unknown

 

                          Marital Status            Unknown

 

                          Zoroastrianism Affiliation     Unknown

 

                          Race                      Unknown

 

                                        Additional Race(s)  

 

                          Ethnic Group              Unknown





Author





                          Author                    [facility]

t

 

                          Organization              [facility]

 

                          Address                   [address]

[place]  [address]



 

                          Phone                     Unavailable







Care Team Providers





                    Care Team Member Name Role                Phone

 

                    GABRIELLA FORD    PCP                 [phone]

 

                    MARIE BEVERLY    Attphys             Unavailable

 

                    YULIA RAMIREZ     Attphys             Unavailable

 

                    MAUREEN SHIN    Attphys             Unavailable







Payers





           Payer Name Policy Type Policy Number Effective Date Expiration Date S

selvin

 

           Medicare A & B            [id number] 2009 00:00:00            C

[facility]

 

           Web Tpa               [id number] 2009 00:00:00            [facility]







Problems

This patient has no known problems.



Allergies, Adverse Reactions, Alerts

This patient has no known allergies or adverse reactions.



Medications





             Ordered Medication Name Filled Medication Name Start Date   Stop Da

te    Current 

Medication? Ordering Clinician Indication Dosage     Frequency  Signature (SIG) 

Comments                  Components                Source

 

                          Aspirin (Aspirin Ec) 81 Mg Tablet.dr 81 Mg Oral Aspir

in (Aspirin Ec) 81 Mg 

Tablet.dr 81 Mg Oral 2018 00:00:00 2019 00:00:00 Emily Holland Md 

           81                    Every Morning                       [facility]

 

                    Famotidine 20 Mg Tab, 20 Mg Oral Famotidine 20 Mg Tab, 20 Mg

 Oral 2018 

00:00:00 2019 00:00:00 Emily Holland Md        20            Every 12

 Hours               [facility]

 

                          Nitroglycerin (Nitrostat) 0.4 Mg Tab.subl, 0.4 Mg Subl

ingual Nitroglycerin 

(Nitrostat) 0.4 Mg Tab.subl, 0.4 Mg Sublingual 2018 00:00:00       2019 

00:00:00 Emily Holland Md         .4              Every 5 Minutes as neede

d for Chest Pain                 

[facility]

 

                Amlodipine Besylate (Norvasc) 5 Mg Tab Amlodipine Besylate (Norv

asc) 5 Mg Tab                 

        Yes                     10              Daily                   [facility]

 

                          Clopidogrel Bisulfate (Clopidogrel) 75 Mg Tablet Clopi

dogrel Bisulfate 

(Clopidogrel) 75 Mg Tablet             Yes               75          Daily      

       [facility]

 

                          Fenofibrate Nanocrystallized (Fenofibrate) 145 Mg Tabl

et Fenofibrate 

Nanocrystallized (Fenofibrate) 145 Mg Tablet             Yes               1    

       Daily             [facility]

 

       Gabapentin 300 Mg Capsule Gabapentin 300 Mg Capsule               Yes    

              300           Twice A 

Day                                                         [facility]

 

     Glipizide 5 Mg Tablet Glipizide 5 Mg Tablet           Yes            5     

    Twice A Day           [facility]

 

       Metformin Hcl 500 Mg Tablet Metformin Hcl 500 Mg Tablet               Yes

                  1000          Twice

A Day                                                       [facility]

 

       Pravastatin Sodium 10 Mg Tablet Pravastatin Sodium 10 Mg Tablet          

     Yes                  1             

Daily                                                       [facility]

 

      Quinapril Hcl 20 Mg Tablet Quinapril Hcl 20 Mg Tablet             Yes     

          40          Daily        

                                        [facility]

 

                          Docusate Sodium (Dok) 100 Mg Tablet, 100 Mg Oral Docus

ate Sodium (Dok) 100 Mg 

Tablet, 100 Mg Oral       2019 00:00:00 No                100         Twic

e A Day             [facility]

 

                          Hydrochlorothiazide 25 Mg Tablet, 12.5 Mg Oral Hydroch

lorothiazide 25 Mg Tablet,

12.5 Mg Oral       2019 00:00:00 No                12.5        Daily      

       [facility]







Procedures

This patient has no known procedures.



Encounters





             Start Date/Time End Date/Time Encounter Type Admission Type [facility]   Care Department Encounter ID    Source

 

          2019 18:14:00 2019 14:29:00 Discharged Inpatient (obs)    

                 [facility]                    [id number]              [facility]

 

          2018 17:38:00 2018 10:35:00 Discharged Inpatient (obs)    

                 [facility]                    [id number]              [facility]

 

           2018 11:00:00 2018 13:55:00 Discharged Inpatient ER      

   ELIANA SHIN 

[facility]              [id number]        [facility]

 

           2017-10-18 10:38:00 2017-10-18 10:38:00 Registered Clinic LUCRETIA DONNELLYINZ 

[facility]              [id number]        [facility]







Results





           Test Description Test Time  Test Comments Results    Result Comments 

Source

 

                US GALLBLADDER  2020 10:01:00                             

                               

                                          [facility]     
                  [address] [address]     
Patient Name: VICKI JOSUE                                MR #: [id number] 
                : 1945                                   Age/Sex: 75/M 
Acct #: [id number]                              Req #: [id number]  Adm 
Physician:                                                      Ordered by: 
MARIE BEVERLY MD                         Report #: [id number]        Location: 
                                      Room/Bed:                   
________________________________________________________________________________

___________________    Procedure: [id number] US/US GALLBLADDER  Exam Date: 
20                            Exam Time: 0856                             
                REPORT STATUS: Signed    EXAM: US GALLBLADDER   DATE: 2020 
8:56 AM     INDICATION:     [id number]    0856    UPPER ABD PAIN   COMPARISON: 
None    TECHNIQUE: Transverse and longitudinal gray scale and color doppler 
sonographic   images of the right upper quadrant were obtained.       FINDINGS: 
         LIVER   18.5 cm in the right midclavicular line.   Liver is mildly 
echogenic with normal contour, no masses.      GALLBLADDER   No gallbladder wall
thickening, distension, stone, or pericholecystic fluid.   Negative reported 
sonographic Perla's sign.      BILE DUCTS   No intra nor extra-hepatic biliary 
dilation.   Common bile duct measures 0.3cm      PANCREAS: Visualized portions 
are normal.      RIGHT KIDNEY: 10.4 cm   Echogenicity: Normal   Collecting 
System:  No hydronephrosis   Stones:  None   Cyst/Mass: There is a 3.3 cm 
anechoic cyst within the superior pole.         VESSELS:   Aorta: Visualized 
portions are within normal size limits   Inferior Vena Cava: Visualized portions
are normal   Main Portal Vein: 1.2 cm, normal size with hepatopetal flow.      
FREE FLUID: None      IMPRESSION:      1. Negative for cholelithiasis or biliary
dilatation.   2. Hepatomegaly with mildly increased echogenicity can be seen in 
patients with   hepatic steatosis.             Signed by: Yayo Almazan MD on 
2020 10:03 AM        Dictated By: YAYO ALMAZAN MD  Electronically Signed 
By: YAYO ALMAZAN MD on 20 1003  Transcribed By: GENET on 20 1003 
     COPY TO:   MARIE BEVERLY MD                                     

 

                CT HIP LEFT WO  2020 15:26:00                             

                               

                                          [address] [address]     
Patient Name: VICKI JOSUE                                MR #: [id number] 
                : 1945                                   Age/Sex: 75/M 
Acct #: [id number]                              Req #: [id number]  Adm 
Physician:                                                      Ordered by: 
YULIA RAMIREZ MD                         Report #: [id number]        Location: 
CT                                      Room/Bed:                   
________________________________________________________________________________

___________________    Procedure: [id number] CT/CT HIP LEFT WO  Exam Date: 
20                            Exam Time: 1215                             
                REPORT STATUS: Signed    EXAM: CT right hip WITHOUT contrast  . 
CT scan left hip without contrast.   INDICATION: Right hip pain. Left hip pain. 
Decreased range of motion.    Diabetes. Hypertension.    COMPARISON: None.   
TECHNIQUE: Right hip and left hip was scanned utilizing a multidetector helical 
 scanner without administration of IV contrast. Coronal and sagittal   
reformations were obtained. Routine protocol was performed.         IV CONTRAST:
None        ORAL CONTRAST: Water                  COMPLICATIONS: None      
RADIATION DOSE:        Total DLP: 1016.64 mGy*cm        Estimated effective 
dose: (DLP x 0.015 x size factor) mSv        CTDIvol has been reviewed. It is 
below the limits set by the Radiation   Protocol Committee (RPC).        Dose 
modulation, iterative reconstruction, and/or weight based adjustment   of the 
mA/kV was utilized to reduce the radiation dose to as low as reasonably   
achievable.       FINDINGS:      Scattered vascular calcification.      No acute
fracture, subluxation or avascular necrosis. Small bone island in the   left 
femoral head and left acetabulum.      Scattered degenerative arthrosis about 
the hips. No osseous erosion.   Physiologic amount of fluid in the hip joints.  
   Prominent heterogeneous prostate gland. Correlate with laboratory analysis.  
   Small fat-containing inguinal hernias.      No lymphadenopathy.      The 
visualized muscles are normal in size and morphology.      Impression:   
Scattered degenerative arthrosis about the right hip. No osseous erosion.      
Scattered degenerative arthrosis about the left hip. No osseous erosion.      
Signed by: Dr. Betzy Rucker M.D. on 2020 3:36 PM        Dictated By: BETZY RUCKER MD, MD  Electronically Signed By: BETZY RUCKER MD, MD on 20 1536  
Transcribed By: GENET on 20 1536       COPY TO:   YULIA RAMIREZ MD      
                                                     

 

                CT HIP RIGHT WO 2020 15:26:00                             

                              

                                           [address] [place] [address]    
 Patient Name: VICKI JOSUE                                MR #: [id number]
                 : 1945                                   Age/Sex: 75/M
 Acct #: [id number]                              Req #: [id number]  Adm 
Physician:                                                      Ordered by: 
YULIA RAMIREZ MD                         Report #: [id number]        Location: 
CT                                      Room/Bed:                   
________________________________________________________________________________

___________________    Procedure: [id number] CT/CT HIP RIGHT WO  Exam Date: 
20                            Exam Time: 1215                             
                REPORT STATUS: Signed    EXAM: CT right hip WITHOUT contrast  . 
CT scan left hip without contrast.   INDICATION: Right hip pain. Left hip pain. 
Decreased range of motion.    Diabetes. Hypertension.    COMPARISON: None.   
TECHNIQUE: Right hip and left hip was scanned utilizing a multidetector helical 
 scanner without administration of IV contrast. Coronal and sagittal   
reformations were obtained. Routine protocol was performed.         IV CONTRAST:
None        ORAL CONTRAST: Water                  COMPLICATIONS: None      
RADIATION DOSE:        Total DLP: 1016.64 mGy*cm        Estimated effective 
dose: (DLP x 0.015 x size factor) mSv        CTDIvol has been reviewed. It is 
below the limits set by the Radiation   Protocol Committee (RPC).        Dose 
modulation, iterative reconstruction, and/or weight based adjustment   of the 
mA/kV was utilized to reduce the radiation dose to as low as reasonably   
achievable.       FINDINGS:      Scattered vascular calcification.      No acute
fracture, subluxation or avascular necrosis. Small bone island in the   left 
femoral head and left acetabulum.      Scattered degenerative arthrosis about 
the hips. No osseous erosion.   Physiologic amount of fluid in the hip joints.  
   Prominent heterogeneous prostate gland. Correlate with laboratory analysis.  
   Small fat-containing inguinal hernias.      No lymphadenopathy.      The 
visualized muscles are normal in size and morphology.      Impression:   
Scattered degenerative arthrosis about the right hip. No osseous erosion.      
Scattered degenerative arthrosis about the left hip. No osseous erosion.      
Signed by: Dr. Betzy Rucker M.D. on 2020 3:36 PM        Dictated By: BETZY RUCKER MD, MD  Electronically Signed By: BETZY RUCKER MD, MD on 20 1536  
Transcribed By: GENET on 20 1536       COPY TO:   YULIA RAMIREZ MD      
                                                     

 

                CT LUMBAR SPINE  2020 13:20:00                          

                              

                                              [address] [place] [address] 
    Patient Name: VICIK JOSUE                                MR #: 
[id number]                  : 1945                                   
Age/Sex: 75/M  Acct #: [id number]                              Req #: [id number]
[id number]  Adm Physician:                                                      
Ordered by: YULIA RAMIREZ MD                         Report #: [id number]       
Location: CT                                      Room/Bed:                   
________________________________________________________________________________

___________________    Procedure: [id number] CT/CT LUMBAR SPINE WO  Exam Date: 
20                            Exam Time: 1215                             
                REPORT STATUS: Signed    CT LUMBAR SPINE WO      HISTORY: 
Bilateral hip pain      COMPARISON:  Chest radiograph 7/10/2020      TECHNIQUE: 
  Axial CT images of the lumbar spine were obtained without contrast.  Coronal  
and sagittal reconstructions obtained from the axial data.  One or more of the  
following dose reduction techniques were used: Automated exposure control,   
adjustment of the mA and/or kV according to patient size, and/or utilization of 
 iterative reconstruction technique.      DISCUSSION:         There are 5 
nonrib-bearing lumbar vertebral bodies.   Lumbar lordosis is preserved.   There 
is no significant scoliosis or subluxation.   No fracture, compression 
deformity, or destructive osseous lesion is seen.   No gross spinal canal mass 
is seen.   The paravertebral and paraspinal soft tissues are unremarkable.      
Mild to moderate multilevel spondylosis is superimposed on a congenitally   
narrow lumbar spinal canal.      L1-L2: No gross canal or foraminal stenosis.   
  L2-L3: No gross canal or foraminal stenosis.      L3-L4: Possible mild canal 
stenosis due to disc bulge and ligamentum flavum   thickening. Mild bilateral 
foraminal stenoses due to disc bulge and facet   arthrosis.       L4-L5: 
Suspected moderate to severe canal stenosis due to disc bulge and   ligamentum 
flavum thickening. Mild to moderate bilateral foraminal stenoses due   to disc 
bulge and facet arthrosis.      L5-S1: Moderate right and mild to moderate left 
foraminal stenoses due to disc   bulge and facet arthrosis. No gross canal 
stenosis.      Aortoiliac calcified atherosclerosis is present.       
IMPRESSION:   1.  No acute osseous abnormalities.   2.  Mild to moderate 
multilevel spondylosis superimposed on a congenitally   narrow lumbar spinal 
canal.   3.  Suspected moderate to severe degenerative/congenital canal stenosis
at   L4-L5.   4.  Multilevel bilateral degenerative foraminal stenoses - 
moderate on the   right at L5-S1.      Findings can be further evaluated with 
lumbar spine MRI if clinically feasible.      Signed by: Dr. Ozzy Ram M.D. on 2020 1:27 PM        Dictated By: OZZY RAM MD  
Electronically Signed By: OZZY RAM MD on 20  Transcribed By: 
GENET on 20       COPY TO:   YULIA RAMIREZ MD                      

               

 

                Stress Test - Treadmill ONLY 2020 17:09:00                

                              

    [facility]                       [address] [address]    Patient Name : VICKI JOSUE
        MR #: [id number]   : 1945         Age/Sex: 75/M      Acct # : 
[id number]           Adm Physician  : YULIA RAMIREZ MD       Admit Date  :  
07/10/20       Location : [place]    Room/Bed :           _________
________________________________________________________________________________

__________                                         REPORT: Myoview Stress Test  
 DATE OF STUDY:  07/10/2020 09:28:00       Stress Test - Treadmill ONLY       
PROCEDURE:  Nuclear gated myocardial perfusion scan.        Thank you, Dr. Yulia Ramirez, for the nuclear stress test interpretation consultation.       REPORT: 
Nuclear gated myocardial perfusion scan performed as per protocol at [facility].  The stress test is supervised by Dr. Yulia Ramirez.  I
did only   the nuclear stress interpretation.  Lexiscan injected 0.4 mg 
intravenous stress agent.   Myoview injected 10 millicuries for resting protocol
and 30 millicuries stress protocol.        IMPRESSION:     1. Inferior wall scar
noted.   2. No ischemia noted.   3. Left ventricular ejection fraction is 58%.  
 Thank you, Dr. Yulia Ramirez for this nuclear interpretation consultation.     
         ______________________________   MD RACHELLE EdouardB/MODL   D:  2020 17:09:04   T:  2020 19:42:58   Job #:  
[id number]           Signature                                            
                      Date                           Dictated By: NELSON LEMUS MD  Transcribed By: TACOS on 20   <Electronically signed by 
NELSON LEMUS MD><<Signature on File>>20 1629    COPY TO:            

                      

 

                CHEST 2 VIEWS   2020-07-10 09:52:00                             

                                

                                         [facility]      
                 [address] [place] [address]      
Patient Name: VICKI JOSUE                                MR #: [id number] 
                : 1945                                   Age/Sex: 75/M 
Acct #: [id number]                              Req #: [id number]  Adm 
Physician:                                                      Ordered by: 
YULIA RAMIREZ MD                         Report #: [id number]        Location: 
[place]                                      Room/Bed:                   
________________________________________________________________________________

___________________    Procedure: [id number] DX/CHEST 2 VIEWS  Exam Date: 
07/10/20                            Exam Time: 920                             
                REPORT STATUS: Signed    X-ray chest PA and lateral      
History: Shortness of breath      Comparison: 2018      Findings: Central 
airways unremarkable. Heart size borderline normal. No   pleural effusion. No 
pneumothorax. No focal lung disease or nodules. Visualized   skeletal structures
is possibility of an old healed right lateral fourth rib   fracture. Upper 
abdomen is unremarkable. Incidentally noted is a coronary   artery stent.      
Impression: No acute cardiopulmonary disease.      Signed by: Prosper Duong MD 
on 7/10/2020 9:54 AM        Dictated By: PROSPER DUONG MD  Electronically 
Signed By: PROSPER DUONG MD on 07/10/20 0954  Transcribed By: GENET on 
07/10/20 0954       COPY TO:   YULIA RAMIREZ MD                                 

    

 

                    Bedside Glucose     2019 11:38:00   

 

                                        Test Item

 

             Bedside Glucose (test code = 76191-9) 224                 H  

           





Meter ID: [id number] Glucose
2018 07:53:00* 



             Test Item    Value        Reference Range Interpretation Comments

 

             Bedside Glucose (test code = 37439-4) 145                 H  

           





Meter ID: [id number] [facility]
2018 06:28:00* 



             Test Item    Value        Reference Range Interpretation Comments

 

             Sodium Level (test code = 2951-2) 140          136-145             

       





[facility] Qdajv2936-06-14 06:28:00* 



             Test Item    Value        Reference Range Interpretation Comments

 

             Potassium Level (test code = 2823-3) 3.7          3.5-5.1          

          





[facility] AllenChloride [id number] 06:28:00* 



             Test Item    Value        Reference Range Interpretation Comments

 

             Chloride Level (test code = 2075-0) 108                 H    

         





[facility] Allen[facility] Dioxide [id number] 06:28:00* 



             Test Item    Value        Reference Range Interpretation Comments

 

             Carbon Dioxide Level (test code = 2028-9) 24           22-29       

               





[facility] AllenAnion [id number] 06:28:00* 



             Test Item    Value        Reference Range Interpretation Comments

 

             Anion Gap (test code = 33037-3) 11.7         8-16                  

     





[facility] Allen[facility] Urea [id number] 06:28:00* 



             Test Item    Value        Reference Range Interpretation Comments

 

             Blood Urea Nitrogen (test code = 3094-0) 15           7-26         

              





[facility] AllenCreatinine2018-03-01 06:28:00* 



             Test Item    Value        Reference Range Interpretation Comments

 

             Creatinine (test code = 2160-0) 0.90         0.72-1.25             

     





[facility] AllenBUN/Creatinine Qkeeo5917-59-33 06:28:00* 



             Test Item    Value        Reference Range Interpretation Comments

 

             BUN/Creatinine Ratio (test code = 3097-3) 17           6-25        

               





[facility] AllenEstimat Glomerular Filtration Rate
2018 06:28:00* 



             Test Item    Value        Reference Range Interpretation Comments

 

             Estimat Glomerular Filtration Rate (test code = 49284-4) 60-       

   >60                        





Ranges were taken from the National Kidney Disease Education Program and the [employer]
[facility] literature.Reference ranges:60 or greater: [id number] (
for 3 consecutive months): Chronic kidney disease 15 or less: Kidney failureTexas Health AllenGlucose Puwwz3745-38-62 06:28:00* 



             Test Item    Value        Reference Range Interpretation Comments

 

             Glucose Level (test code = ARU1123) 155                 H    

         





[facility] AllenCalcium Hliyh3156-98-93 06:28:00* 



             Test Item    Value        Reference Range Interpretation Comments

 

             Calcium Level (test code = 78197-0) 9.0          8.4-10.2          

         





[facility] Bdwfn5553-23-72 06:05:00* 



             Test Item    Value        Reference Range Interpretation Comments

 

             White Blood Count (test code = 6690-2) 5.20         4.8-10.8       

            





[facility] Vwskg7086-29-97 06:05:00* 



             Test Item    Value        Reference Range Interpretation Comments

 

             Red Blood Count (test code = 789-8) 3.19         4.3-5.7      L    

         





[facility]Hemoglobin2018-03-01 06:05:00* 



             Test Item    Value        Reference Range Interpretation Comments

 

             Hemoglobin (test code = 70730-1) 9.9          14.0-18.0    L       

      





[facility]Hematocrit2018-03-01 06:05:00* 



             Test Item    Value        Reference Range Interpretation Comments

 

             Hematocrit (test code = 4544-3) 28.5         38.2-49.6    L        

     





[facility] AllenMean Corpuscular Hnzcmr8581-40-31 
06:05:00* 



             Test Item    Value        Reference Range Interpretation Comments

 

             Mean Corpuscular Volume (test code = 787-2) 89.3         81-99     

                 





[facility]Mean Corpuscular Vwkocwldyi6759-17-94 
06:05:00* 



             Test Item    Value        Reference Range Interpretation Comments

 

             Mean Corpuscular Hemoglobin (test code = 785-6) 31.0         28-32 

                     





[facility]Mean Corpuscular Hemoglobin Concent
2018 06:05:00* 



             Test Item    Value        Reference Range Interpretation Comments

 

             Mean Corpuscular Hemoglobin Concent (test code = 786-4) 34.7       

  31-35                      





[facility]Red Cell Distribution Maxbe4674-30-18 
06:05:00* 



             Test Item    Value        Reference Range Interpretation Comments

 

             Red Cell Distribution Width (test code = 79781-8) 12.9         11.7

-14.4                  





[facility] AllenPlatelet Pjwla4781-37-97 06:05:00* 



             Test Item    Value        Reference Range Interpretation Comments

 

             Platelet Count (test code = 777-3) 239          140-360            

        





[facility] AllenNeutrophils (%) (Auto)2018 06:05:00
  * 



             Test Item    Value        Reference Range Interpretation Comments

 

             Neutrophils (%) (Auto) (test code = 59306-2) 63.1         38.7-80.0

                  





[facility] AllenLymphocytes (%) (Auto)2018 06:05:00
  * 



             Test Item    Value        Reference Range Interpretation Comments

 

             Lymphocytes (%) (Auto) (test code = 736-9) 26.7         18.0-39.1  

                





[facility] AllenMonocytes (%) (Auto)2018 06:05:00* 



             Test Item    Value        Reference Range Interpretation Comments

 

             Monocytes (%) (Auto) (test code = 5905-5) 6.9          4.4-11.3    

               





[facility] AllenEosinophils (%) (Auto)2018 06:05:00
  * 



             Test Item    Value        Reference Range Interpretation Comments

 

             Eosinophils (%) (Auto) (test code = 713-8) 2.5          0.0-6.0    

                





[facility] AllenBasophils (%) (Auto)2018 06:05:00* 



             Test Item    Value        Reference Range Interpretation Comments

 

             Basophils (%) (Auto) (test code = 706-2) 0.6          0.0-1.0      

              





[facility] AllenIM GRANULOCYTES %2018 06:05:00* 



             Test Item    Value        Reference Range Interpretation Comments

 

             IM GRANULOCYTES % (test code = IM GRANULOCYTES %) 0.2          0.0-

1.0                    





[facility] AllenNeutrophils # (Auto)2018 06:05:00* 



             Test Item    Value        Reference Range Interpretation Comments

 

             Neutrophils # (Auto) (test code = 751-8) 3.3          2.1-6.9      

              





[facility] AllenLymphocytes # (Auto)2018 06:05:00* 



             Test Item    Value        Reference Range Interpretation Comments

 

             Lymphocytes # (Auto) (test code = 11773-8) 1.4          1.0-3.2    

                





[facility] AllenMonocytes # (Auto)2018 06:05:00* 



             Test Item    Value        Reference Range Interpretation Comments

 

             Monocytes # (Auto) (test code = 742-7) 0.4          0.2-0.8        

            





[facility] AllenEosinophils # (Auto)2018 06:05:00* 



             Test Item    Value        Reference Range Interpretation Comments

 

             Eosinophils # (Auto) (test code = 711-2) 0.1          0.0-0.4      

              





[facility] AllenBasophils # (Auto)2018 06:05:00* 



             Test Item    Value        Reference Range Interpretation Comments

 

             Basophils # (Auto) (test code = 704-7) 0.0          0.0-0.1        

            





[facility] AllenAbsolute Immature Granulocyte (auto
2018 06:05:00* 



             Test Item    Value        Reference Range Interpretation Comments

 

                                        Absolute Immature Granulocyte (auto (trinity

t code = Absolute Immature Granulocyte 

(auto)          0.01            0-0.1                            





[facility] AllenVitamin B12 Jembo4627-86-49 07:59:00* 



             Test Item    Value        Reference Range Interpretation Comments

 

             Vitamin B12 Level (test code = 52743-9) 225          213-816       

             





[facility] AllenFolate2018-02-14 07:59:00* 



             Test Item    Value        Reference Range Interpretation Comments

 

             Folate (test code = 2284-8) 16.3         7.0-15.4     H            

 





[facility] AllenFerritin2018-02-14 07:48:00* 



             Test Item    Value        Reference Range Interpretation Comments

 

             Ferritin (test code = 2276-4) 99.02        21..66            

   





[facility] AllenIron Lssuv0396-92-25 07:30:00* 



             Test Item    Value        Reference Range Interpretation Comments

 

             Iron Level (test code = 2498-4) 66                           

     





[facility] AllenTotal Iron Binding Bvwsozbo6773-74-26 
07:30:00* 



             Test Item    Value        Reference Range Interpretation Comments

 

             Total Iron Binding Capacity (test code = 2500-7) 375          261-4

78                    





[facility] AllenPercent Iron Nzccvzntpw2698-96-14 
07:30:00* 



             Test Item    Value        Reference Range Interpretation Comments

 

             Percent Iron Saturation (test code = 2502-3) 18           15-50    

                  





[facility] AllenTransferrin2018-02-14 07:30:00* 



             Test Item    Value        Reference Range Interpretation Comments

 

             Transferrin (test code = 3034-6) 268          174-364              

      





[facility] AllenPercent Reticulocyte Ftncw8577-95-05 
07:03:00* 



             Test Item    Value        Reference Range Interpretation Comments

 

             Percent Reticulocyte Count (test code = 31519-2) 2.2          0.8-2

.2                    





[facility] AllenTriglycerides Qpllm0131-07-99 07:24:00* 



             Test Item    Value        Reference Range Interpretation Comments

 

             Triglycerides Level (test code = 2571-8) 191          0-149        

H             





[facility] AllenCholesterol Dkttk1362-67-53 07:24:00* 



             Test Item    Value        Reference Range Interpretation Comments

 

             Cholesterol Level (test code = 2093-3) 119          0-199          

            





Less than 200 mg/dL       Low Foek266 - 239 mg/dL           Borderline Mbnq161 m
g/dl and greater     High Risk                        [facility] AllenLDL Efilyfimqeq9195-30-52 07:24:00* 



             Test Item    Value        Reference Range Interpretation Comments

 

             LDL Cholesterol (test code = 2089-1) 58                  L   

          





[facility] AllenHDL Cosfvyuqmha5145-23-90 07:24:00* 



             Test Item    Value        Reference Range Interpretation Comments

 

             HDL Cholesterol (test code = 2085-9) 23           40-60        L   

          





[facility] AllenCholesterol/HDL Qgjin1896-32-68 07:24:00
  * 



             Test Item    Value        Reference Range Interpretation Comments

 

             Cholesterol/HDL Ratio (test code = 9830-1) 5.2          3.9-4.7    

  H             





[facility] AllenCreatine Hfsbxw1945-34-63 07:18:00* 



             Test Item    Value        Reference Range Interpretation Comments

 

             Creatine Kinase (test code = 2157-6) 55                      

          





[facility] AllenCreatine Kinase SU2380-86-09 07:18:00* 



             Test Item    Value        Reference Range Interpretation Comments

 

             Creatine Kinase MB (test code = 31904-8) 2.20         0.00-5.00    

              





[facility] AllenTroponin -97-96 07:18:00* 



             Test Item    Value        Reference Range Interpretation Comments

 

             Troponin I (test code = GDF8595) 0.339        0-0.300      H       

      





[facility] AllenThyroid Stimulating Hormone (TSH)
2018 17:59:00* 



             Test Item    Value        Reference Range Interpretation Comments

 

             Thyroid Stimulating Hormone (TSH) (test code = 92578-4) 2.336      

  0.350-4.940                





[facility] AllenHemoglobin A1c Tmlvmbn5597-72-35 17:38:00
  * 



             Test Item    Value        Reference Range Interpretation Comments

 

             Hemoglobin A1c Percent (test code = Hemoglobin A1c Percent) 8.6    

      4.0-7.0      H             





[facility] AllenB-Type Natriuretic Oonsegs5784-88-52 
15:42:00* 



             Test Item    Value        Reference Range Interpretation Comments

 

             B-Type Natriuretic Peptide (test code = 16604-6) 40.1         0-100

                      





[facility] AllenProthrombin Ppdh4519-04-01 15:39:00* 



             Test Item    Value        Reference Range Interpretation Comments

 

             Prothrombin Time (test code = 5902-2) 12.8         11.9-14.5       

           





[facility] AllenProthromb Time International Ratio
2018 15:39:00* 



             Test Item    Value        Reference Range Interpretation Comments

 

             Prothromb Time International Ratio (test code = 6301-6) 1.04       

                             





Oral Anticoagulant Therapy INR Values:1. Low Intensity Therapy        1.5 - 2.02
. Moderate Intensity Therapy   2.0 - 3.03. High Intensity Therapy(1)    2.5 - 3.
54. High Intensity Therapy(2)    3.0 - 4.05. Panic Value INR              > 5.0
[facility] AllenActivated Partial Thromboplast Time
2018 15:39:00* 



             Test Item    Value        Reference Range Interpretation Comments

 

             Activated Partial Thromboplast Time (test code = 46835-5) 27.9     

    23.8-35.5                  





[facility] AllenTotal Bqnczxhpt8333-29-15 15:37:00* 



             Test Item    Value        Reference Range Interpretation Comments

 

             Total Bilirubin (test code = 1975-2) 0.4          0.2-1.2          

          





[facility] AllenAspartate Amino Transf (AST/SGOT)
2018 15:37:00* 



             Test Item    Value        Reference Range Interpretation Comments

 

                                        Aspartate Amino Transf (AST/SGOT) (test 

code = Aspartate Amino Transf 

(AST/SGOT))     14              5-34                             





[facility] AllenAlanine Aminotransferase (ALT/SGPT)
2018 15:37:00* 



             Test Item    Value        Reference Range Interpretation Comments

 

             Alanine Aminotransferase (ALT/SGPT) (test code = 1742-6) 11        

   0-55                       





[facility] AllenTotal Osjbecg4726-75-10 15:37:00* 



             Test Item    Value        Reference Range Interpretation Comments

 

             Total Protein (test code = 2885-2) 7.9          6.5-8.1            

        





[facility] AllenAlbumin2018-02-11 15:37:00* 



             Test Item    Value        Reference Range Interpretation Comments

 

             Albumin (test code = 1751-7) 4.2          3.5-5.0                  

  





[facility] AllenGlobulin2018-02-11 15:37:00* 



             Test Item    Value        Reference Range Interpretation Comments

 

             Globulin (test code = 80657-8) 3.7          2.3-3.5      H         

    





[facility] AllenAlbumin/Globulin Vielt5125-09-91 15:37:00
  * 



             Test Item    Value        Reference Range Interpretation Comments

 

             Albumin/Globulin Ratio (test code = 1759-0) 1.1          0.8-2.0   

                 





[facility] AllenAlkaline Htydkvfpzbv5874-16-54 15:37:00* 



             Test Item    Value        Reference Range Interpretation Comments

 

             Alkaline Phosphatase (test code = 6768-6) 70                 

               





[facility] AllenCHE[facility]INGLE ([facility])    [facility]   [address] [place], [place] [id number]      Patient Name: VICKI JOSUE   MR #: [id number]    : 1945
Age/Sex: 72/M  Acct #: [id number] Req #: [id number]  Adm Physician:     
Ordered by: ELIANA SHIN MD  Report #: [id number]   Location: ER  Room/Bed:  
  ______________________________________________________________________
_____________________________    Procedure: [id number] DX/CHEST SINGLE (PORTABLE)
 Exam Date: 18                            Exam Time: 1545       REPORT ST
ATUS: Signed    EXAMINATION: Chest,  CHEST SINGLE (PORTABLE)          INDICATION
: Chest pain      COMPARISON:  None           FINDINGS:          LINES:  None.  
   Heart:  Normal cardiac silhouette.      Vascular: The pulmonary vasculature 
is within normal limits.        Mediastinum: No mediastinal, hilar, or axillary 
mass or lymphadenopathy.      Lungs: No parenchymal mass.  No focal consolidatio
n.      Pleura:  No pleural effusion.  No pneumothorax.      Bones: No acute oss
eous abnormality.  Degenerative changes of the thoracic   spine.      Soft tissu
es:  Normal.      Impression:    No acute radiographic abnormality.      Signed 
by: Dr. Keaton Linares M.D. on 2018 4:00 PM        Dictated By: KEATON MACHADO  Electronically Signed By: KEATON LINARES MD on 18 1600  Transcribed By: JAIMEE CA on 18 1600       COPY TO:   ELIANA SHIN MD        Stress Test - 
Treadmill ONLY [place] [address]    Patient Name : VICKI JOSUE         MR #: 
[id number]   : 1945         Age/Sex: 72/M      Acct # : [id number]   
       Adm Physician  : YULIA RAMIREZ MD       Admit Date  :         Location : 
[place]    Room/Bed :           
__________________________________________________________________
_________________________________     REPORT: Cardiology Report       DATE OF ST
UDY:  2017       LEXISCAN NUCLEAR STRESS TEST      REQUESTING PHYSIC
NYLA:  Dr. Ramirez.        DESCRIPTION OF PROCEDURE:  After informed consent, eugenie beasley was brought to    the stress lab.  Dr. Ramirez performed the stress portion
of the study.  He    was given 10 millicuries of technetium 99 Myoview, and barrie
cardial perfusion    SPECT images obtained in the horizontal long-axis, short-ax
is and vertical    long-axis views.  Subsequently, patient was given 0.4 mg Carlie
scan over 10    seconds.  The patient was given 30 mCi of technetium 99 Myoview 
and    myocardial perfusion SPECT images obtained in the horizontal long-axis,  
 short-axis and vertical long-axis views.   Gating images were also    obtained.
 Patient tolerated the procedure without any complications.        REPORT:  [facility] EKG shows sinus bradycardia 55 beats per minute.  Left    axis deviation.
 Normal intervals.  Nonspecific ST-T changes.      PARAMETERS   1. Resting heart
rate 60 56 beats per minute.   2. Maximum heart rate is 73 beats per minute.   
3. Resting blood pressure 155/67 mmHg.    4. Maximum blood pressure 160/65 mmHg.
     REASON FOR TERMINATION:  Endpoint attained.      INTERPRETATION   1. Nega
tive for chest pain.   2. Negative for arrhythmias.   3. Blood pressure response
consistent with Lexiscan.   4. No significant ST-T changes seen during Lexiscan 
infusion compared to    baseline.   5. Analysis of SPECT images reveals dilated 
left ventricle.   6. Patchy radioisotope uptake in the inferior basal wall with
out any    significant perfusion defects.      CONCLUSIONS:     1. No evidence s
ignificant ischemia or infarction on this study.   2. No wall motion abnormaliti
es.     3. Overall ejection fraction is 52%.              DD:  10/18/2017 18:01 
 DT:  10/18/2017 18:10   Job#:  [id number]      cc:   YULIA RAMIREZ          
Signature                                                                   Date
                          Dictated By: CAROLYNN NICHOLAS MD  Transcribed By: EDS
on 10/18/17   <Electronically signed by CAROLYNN NICHOLAS MD><<Signature on File>>
10/20/17 5379    COPY TO:

## 2020-09-01 NOTE — EMERGENCY DEPARTMENT NOTE
History of Present Illnes


History of Present Illness


Chief Complaint:  Extremity Trauma/Pain


History of Present Illness


This is a 75 year old  male pt stated that a week ago a piece of plywood

fell on his right foot.  small scrape noted to the right foot 2nd toe.   

prescribed and antibiotic; put he's still having pain.


Historian:  Patient


Arrival Mode:  Car


Additional Treatment PTA:  n/a


 Required:  No


Onset (how long ago):  week(s) (1)


Location:  RIGHT SECOND TOE


Quality:  PAIN


Radiation:  Reports non-radiation


Severity:  moderate


Onset quality:  sudden


Timing of current episode:  constant


Progression:  unchanged


Chronicity:  new


Context:  Denies recent illness


Relieving factors:  none


Exacerbating factors:  none


Associated symptoms:  Reports denies other symptoms





Past Medical/Family History


Physician Review


I have reviewed the patient's past medical and family history.  Any updates have

been documented here.





Past Medical History


Recent Fever:  No


Clinical Suspicion of Infectio:  No


New/Unexplained Change in Ment:  No


Past Medical History:  Hypertension, Diabetes


Other Surgery:  


RT knee, bilateral hips





Social History


Smoking Cessation:  Never Smoker


Counseling Performed:  No


Alcohol Use:  None


Any Illegal Drug Use:  No


TB Exposure/Symptoms:  No


Physically hurt or threatened:  No





Family History


Family history of heart diseas:  No





Other


Any Pre-Existing Lines (PICC,:  No





Review of Systems


Review of Systems


Constitutional:  Reports no symptoms


EENTM:  Reports no symptoms


Cardiovascular:  Reports no symptoms


Respiratory:  Reports no symptoms


Gastrointestinal:  Reports no symptoms


Genitourinary:  Reports no symptoms


Musculoskeletal:  Reports as per HPI


Integumentary:  Reports no symptoms


Neurological:  Reports no symptoms


Psychological:  Reports no symptoms


Endocrine:  Reports no symptoms


Hematological/Lymphatic:  Reports no symptoms





Physical Exam


Related Data


Allergies:  


Coded Allergies:  


     No Known Allergies (Unverified , 2/11/18)


Triage Vital Signs





Vital Signs








  Date Time  Temp Pulse Resp B/P (MAP) Pulse Ox O2 Delivery O2 Flow Rate FiO2


 


9/1/20 11:33 98.6 68 18 133/56 95 Room Air  








Vital signs reviewed:  Yes





Physical Exam


CONSTITUTIONAL





Constitutional:  Present well-developed, Present well-nourished


HENT


HENT:  Present normocephalic, Present atraumatic, Present oropharynx 

clear/moist, Present nose normal


HENT L/R:  Present left ext ear normal, Present right ext ear normal


EYES





Eyes:  Reports PERRL, Reports conjunctivae normal


NECK


Neck:  Present ROM normal


PULMONARY


Pulmonary:  Present effort normal, Present breath sounds normal


CARDIOVASCULAR





Cardiovascular:  Present regular rhythm, Present heart sounds normal, Present 

capillary refill normal, Present normal rate


GASTROINTESTINAL





Abdominal:  Present soft, Present nontender, Present bowel sounds normal


GENITOURINARY





Genitourinary:  Present exam deferred


SKIN


Skin:  Present warm, Present dry


MUSCULOSKELETAL





Musculoskeletal:  Present ROM normal, Present other (RIGHT SECOND TOE TENDER AT 

DORSUM AROUND MTP JOINT, SMALL ABRASION DORSUM OF 2ND TOE, 2ND AND 3RD TOE WITH 

MILD ECCHYMOSIS, NO FB SEEN OR PALPABLE); 


   Absent deformity


NEUROLOGICAL





Neurological:  Present alert, Present oriented x 3, Present no gross motor or 

sensory deficits


PSYCHOLOGICAL


Psychological:  Present mood/affect normal, Present judgement normal





Results


Imaging


Imaging results reviewed:  Yes





Assessment & Plan


Medical Decision Making


RAYO UMAÑA FELL ONTO RIGHT SECOND TOE 1 WEEK AGO - CHECK XRAY R/O FX





Reassessment


Reassessment


CONTINUE KEFLEX AS DIRECTED, TRAMADOL RX, F/U PCP AND DR LESLIE CASON (ORTHO 

FOOT SPECIALIST), POST-OP SHOE





Assessment & Plan


Final Impression:  


(1) Contusion, toe


Depart Disposition:  HOME, SELF-CARE


Last Vital Signs











  Date Time  Temp Pulse Resp B/P (MAP) Pulse Ox O2 Delivery O2 Flow Rate FiO2


 


9/1/20 11:33 98.6 68 18 133/56 95 Room Air  








Home Meds


Reported Medications


Isosorbide Mononitrate (ISOSORBIDE MONONITRATE ER) 30 Mg Tab.er.24h, 15 MG PO 

BID, #30 TAB


   7/8/20


Hydrochlorothiazide (HYDROCHLOROTHIAZIDE) 25 Mg Tablet, 12.5 MG PO DAILY, #30 

TAB


   7/8/20


Dapagliflozin Propanediol (Farxiga) 10 Mg Tablet, 10 MG PO DAILY


   7/8/20


Clopidogrel Bisulfate (CLOPIDOGREL) 75 Mg Tablet, 75 MG PO DAILY, #30 TAB


   1/2/19


Quinapril Hcl (QUINAPRIL HCL) 20 Mg Tablet, 40 MG PO DAILY, #30 TAB


   2/27/18


Pravastatin Sodium (PRAVASTATIN SODIUM) 10 Mg Tablet, 1 TAB PO DAILY


   2/11/18


Amlodipine Besylate (NORVASC) 5 Mg Tab, 10 MG PO DAILY, #30 TAB


   2/11/18


Fenofibrate Nanocrystallized (FENOFIBRATE) 145 Mg Tablet, 1 TAB PO DAILY


   2/11/18


Glipizide (GLIPIZIDE) 5 Mg Tablet, 5 MG PO BID, TAB


   2/11/18


Metformin Hcl (METFORMIN HCL) 500 Mg Tablet, 1000 MG PO BID, #60 TAB


   2/11/18


Gabapentin (GABAPENTIN) 300 Mg Capsule, 300 MG PO BID, #60 CAP


   2/11/18


Medications in the ED





Acetaminophen/ Hydrocodone Bitart 1 ea ONCE  ONCE PO  Last administered on 

9/1/20at 11:53; Admin Dose 1 EA;  Start 9/1/20 at 11:45;  Stop 9/1/20 at 11:46; 

Status DC











PERI DONOVAN MD                Sep 1, 2020 12:16

## 2020-09-01 NOTE — DIAGNOSTIC IMAGING REPORT
FOOT RIGHT COMPLETE - 3 views



HISTORY:  Pain.    

COMPARISON: None available.

     

FINDINGS:

Bones:

No acute displaced fracture.  

Osseous alignment is within normal limits.

Plantar calcaneal spurring.



Joints:

The joint spaces are relatively preserved.



Soft tissues:

Multiple vascular calcifications.







IMPRESSION: 

No acute radiographic abnormality.



Signed by: Peter Acevedo MD on 9/1/2020 12:03 PM

## 2020-10-01 ENCOUNTER — HOSPITAL ENCOUNTER (OUTPATIENT)
Dept: HOSPITAL 88 - CARD | Age: 75
End: 2020-10-01
Attending: INTERNAL MEDICINE
Payer: MEDICARE

## 2020-10-01 DIAGNOSIS — I74.4: Primary | ICD-10-CM

## 2020-10-01 DIAGNOSIS — I73.9: ICD-10-CM

## 2020-10-01 PROCEDURE — 93925 LOWER EXTREMITY STUDY: CPT

## 2020-10-08 LAB
BASOPHILS # BLD AUTO: 0 10*3/UL (ref 0–0.1)
BASOPHILS NFR BLD AUTO: 0.7 % (ref 0–1)
DEPRECATED NEUTROPHILS # BLD AUTO: 3.3 10*3/UL (ref 2.1–6.9)
EOSINOPHIL # BLD AUTO: 0.2 10*3/UL (ref 0–0.4)
EOSINOPHIL NFR BLD AUTO: 4.1 % (ref 0–6)
ERYTHROCYTE [DISTWIDTH] IN CORD BLOOD: 13.2 % (ref 11.7–14.4)
HCT VFR BLD AUTO: 33.5 % (ref 38.2–49.6)
HGB BLD-MCNC: 11.2 G/DL (ref 14–18)
LYMPHOCYTES # BLD: 1.4 10*3/UL (ref 1–3.2)
LYMPHOCYTES NFR BLD AUTO: 25.9 % (ref 18–39.1)
MCH RBC QN AUTO: 31.8 PG (ref 28–32)
MCHC RBC AUTO-ENTMCNC: 33.4 G/DL (ref 31–35)
MCV RBC AUTO: 95.2 FL (ref 81–99)
MONOCYTES # BLD AUTO: 0.5 10*3/UL (ref 0.2–0.8)
MONOCYTES NFR BLD AUTO: 8.4 % (ref 4.4–11.3)
NEUTS SEG NFR BLD AUTO: 60.7 % (ref 38.7–80)
PLATELET # BLD AUTO: 194 X10E3/UL (ref 140–360)
RBC # BLD AUTO: 3.52 X10E6/UL (ref 4.3–5.7)

## 2020-10-13 ENCOUNTER — HOSPITAL ENCOUNTER (OUTPATIENT)
Dept: HOSPITAL 88 - OR | Age: 75
Discharge: HOME | End: 2020-10-13
Attending: INTERNAL MEDICINE
Payer: MEDICARE

## 2020-10-13 VITALS — DIASTOLIC BLOOD PRESSURE: 58 MMHG | SYSTOLIC BLOOD PRESSURE: 122 MMHG

## 2020-10-13 DIAGNOSIS — Z79.02: ICD-10-CM

## 2020-10-13 DIAGNOSIS — Z01.810: ICD-10-CM

## 2020-10-13 DIAGNOSIS — Z01.812: ICD-10-CM

## 2020-10-13 DIAGNOSIS — K29.70: Primary | ICD-10-CM

## 2020-10-13 DIAGNOSIS — K64.8: ICD-10-CM

## 2020-10-13 DIAGNOSIS — Z79.84: ICD-10-CM

## 2020-10-13 DIAGNOSIS — D12.4: ICD-10-CM

## 2020-10-13 DIAGNOSIS — K26.9: ICD-10-CM

## 2020-10-13 DIAGNOSIS — K58.9: ICD-10-CM

## 2020-10-13 DIAGNOSIS — D12.2: ICD-10-CM

## 2020-10-13 DIAGNOSIS — Z11.59: ICD-10-CM

## 2020-10-13 DIAGNOSIS — I10: ICD-10-CM

## 2020-10-13 DIAGNOSIS — E78.5: ICD-10-CM

## 2020-10-13 DIAGNOSIS — E11.9: ICD-10-CM

## 2020-10-13 DIAGNOSIS — I25.10: ICD-10-CM

## 2020-10-13 PROCEDURE — 85025 COMPLETE CBC W/AUTO DIFF WBC: CPT

## 2020-10-13 PROCEDURE — 36415 COLL VENOUS BLD VENIPUNCTURE: CPT

## 2020-10-13 PROCEDURE — 43239 EGD BIOPSY SINGLE/MULTIPLE: CPT

## 2020-10-13 PROCEDURE — 93005 ELECTROCARDIOGRAM TRACING: CPT

## 2020-10-13 PROCEDURE — 45378 DIAGNOSTIC COLONOSCOPY: CPT

## 2020-10-13 PROCEDURE — 88312 SPECIAL STAINS GROUP 1: CPT

## 2020-10-13 PROCEDURE — 88305 TISSUE EXAM BY PATHOLOGIST: CPT

## 2020-10-13 PROCEDURE — 45390 COLONOSCOPY W/RESECTION: CPT

## 2020-10-13 PROCEDURE — 82948 REAGENT STRIP/BLOOD GLUCOSE: CPT

## 2020-10-13 PROCEDURE — 45384 COLONOSCOPY W/LESION REMOVAL: CPT

## 2020-11-21 ENCOUNTER — HOSPITAL ENCOUNTER (EMERGENCY)
Dept: HOSPITAL 88 - ER | Age: 75
Discharge: HOME | End: 2020-11-21
Payer: MEDICARE

## 2020-11-21 VITALS — WEIGHT: 188 LBS | HEIGHT: 69 IN | BODY MASS INDEX: 27.85 KG/M2

## 2020-11-21 DIAGNOSIS — I10: ICD-10-CM

## 2020-11-21 DIAGNOSIS — E11.65: ICD-10-CM

## 2020-11-21 DIAGNOSIS — L98.499: Primary | ICD-10-CM

## 2020-11-21 LAB
ALBUMIN SERPL-MCNC: 3.8 G/DL (ref 3.5–5)
ALBUMIN/GLOB SERPL: 1.2 {RATIO} (ref 0.8–2)
ALP SERPL-CCNC: 67 IU/L (ref 40–150)
ALT SERPL-CCNC: 8 IU/L (ref 0–55)
ANION GAP SERPL CALC-SCNC: 14.8 MMOL/L (ref 8–16)
BASOPHILS # BLD AUTO: 0.1 10*3/UL (ref 0–0.1)
BASOPHILS NFR BLD AUTO: 1 % (ref 0–1)
BUN SERPL-MCNC: 23 MG/DL (ref 7–26)
BUN/CREAT SERPL: 21 (ref 6–25)
CALCIUM SERPL-MCNC: 9.3 MG/DL (ref 8.4–10.2)
CHLORIDE SERPL-SCNC: 107 MMOL/L (ref 98–107)
CO2 SERPL-SCNC: 26 MMOL/L (ref 22–29)
DEPRECATED NEUTROPHILS # BLD AUTO: 3.1 10*3/UL (ref 2.1–6.9)
EGFRCR SERPLBLD CKD-EPI 2021: > 60 ML/MIN (ref 60–?)
EOSINOPHIL # BLD AUTO: 0.2 10*3/UL (ref 0–0.4)
EOSINOPHIL NFR BLD AUTO: 3.2 % (ref 0–6)
ERYTHROCYTE [DISTWIDTH] IN CORD BLOOD: 13 % (ref 11.7–14.4)
GLOBULIN PLAS-MCNC: 3.1 G/DL (ref 2.3–3.5)
GLUCOSE SERPLBLD-MCNC: 165 MG/DL (ref 74–118)
HCT VFR BLD AUTO: 34.4 % (ref 38.2–49.6)
HGB BLD-MCNC: 11.5 G/DL (ref 14–18)
LYMPHOCYTES # BLD: 1.2 10*3/UL (ref 1–3.2)
LYMPHOCYTES NFR BLD AUTO: 24.3 % (ref 18–39.1)
MCH RBC QN AUTO: 31.2 PG (ref 28–32)
MCHC RBC AUTO-ENTMCNC: 33.4 G/DL (ref 31–35)
MCV RBC AUTO: 93.2 FL (ref 81–99)
MONOCYTES # BLD AUTO: 0.5 10*3/UL (ref 0.2–0.8)
MONOCYTES NFR BLD AUTO: 9.6 % (ref 4.4–11.3)
NEUTS SEG NFR BLD AUTO: 61.7 % (ref 38.7–80)
PLATELET # BLD AUTO: 206 X10E3/UL (ref 140–360)
POTASSIUM SERPL-SCNC: 3.8 MMOL/L (ref 3.5–5.1)
RBC # BLD AUTO: 3.69 X10E6/UL (ref 4.3–5.7)
SODIUM SERPL-SCNC: 144 MMOL/L (ref 136–145)

## 2020-11-21 PROCEDURE — 36415 COLL VENOUS BLD VENIPUNCTURE: CPT

## 2020-11-21 PROCEDURE — 80053 COMPREHEN METABOLIC PANEL: CPT

## 2020-11-21 PROCEDURE — 85025 COMPLETE CBC W/AUTO DIFF WBC: CPT

## 2020-11-21 PROCEDURE — 87040 BLOOD CULTURE FOR BACTERIA: CPT

## 2020-11-21 PROCEDURE — 99283 EMERGENCY DEPT VISIT LOW MDM: CPT

## 2020-11-21 PROCEDURE — 85651 RBC SED RATE NONAUTOMATED: CPT

## 2020-11-21 NOTE — DIAGNOSTIC IMAGING REPORT
X-ray 3 views of the hand



HISTORY:  Pain.    

COMPARISON: None available.

     

FINDINGS:

Bones:

No acute displaced fracture.  

Osseous alignment is within normal limits.



Joints:

The joint spaces are well-maintained.



Soft tissues:

Atherosclerotic vascular calcification.



IMPRESSION: 



No osseous abnormality identified.



Signed by: Cherelle Morris MD on 11/21/2020 8:36 AM

## 2020-11-21 NOTE — EMERGENCY DEPARTMENT NOTE
History of Present Illnes


History of Present Illness


Chief Complaint:  General Medicine Complaints


History of Present Illness


This is a 75 year old  male arrives to the ED with a ulcer over his 

right hand that has been present for several weeks. Patient states his 

antibiotics were changed recently by his primary care doctor and he is scheduled

for an outpatient MRI infectious disease follow-up. Patient states he came to [facility] ER today just to ensure can wait a few more days for follow-up. .








Chief Complaint Comment PATIENT IN FROM HOME WITH COMPLAINTS OF SORE ON THE BACK

OF RIGHT HAND X 3 WEEKS; STATES THAT HE HAS BEEN ON 2 DIFFERENT ANTIBIOTICS BUT 

IT IS NOT GETTING ANY BETTER.  PATIENT HAS ORDERS FOR AN MRI AND BLOOD WORK.


Historian:  Patient, Family Member


Arrival Mode:  Car


 Required:  No


Onset (how long ago):  week(s)


Radiation:  Reports non-radiation


Severity:  mild


Timing of current episode:  constant


Progression:  unchanged


Context:  Reports trauma/injury


Relieving factors:  none





Past Medical/Family History


Physician Review


I have reviewed the patient's past medical and family history.  Any updates have

been documented here.





Past Medical History


Recent Fever:  No


Clinical Suspicion of Infectio:  Yes


New/Unexplained Change in Ment:  No


Past Medical History:  Hypertension, Diabetes


Other Surgery:  


RT knee, bilateral hips





Social History


Alcohol Use:  None


Any Illegal Drug Use:  No


Physically hurt or threatened:  No





Other


Any Pre-Existing Lines (PICC,:  No





Review of Systems


Review of Systems


Constitutional:  Reports no symptoms


EENTM:  Reports no symptoms


Cardiovascular:  Reports no symptoms


Respiratory:  Reports no symptoms


Gastrointestinal:  Reports no symptoms


Genitourinary:  Reports no symptoms


Musculoskeletal:  Reports as per HPI, Reports joint swelling


Integumentary:  Reports no symptoms


Neurological:  Reports no symptoms


Psychological:  Reports no symptoms


Endocrine:  Reports no symptoms


Hematological/Lymphatic:  Reports no symptoms





Physical Exam


Related Data


Allergies:  


Coded Allergies:  


     No Known Allergies (Unverified , 2/11/18)


Triage Vital Signs





Vital Signs








  Date Time  Temp Pulse Resp B/P (MAP) Pulse Ox O2 Delivery O2 Flow Rate FiO2


 


11/21/20 07:34 96.8 65 18 161/73 98 Room Air  








Vital signs reviewed:  Yes





Physical Exam


CONSTITUTIONAL





Constitutional:  Present well-developed, Present well-nourished


HENT


HENT:  Present normocephalic, Present atraumatic, Present oropharynx 

clear/moist, Present nose normal


HENT L/R:  Present left ext ear normal, Present right ext ear normal


EYES





Eyes:  Reports PERRL, Reports conjunctivae normal


NECK


Neck:  Present ROM normal


PULMONARY


Pulmonary:  Present effort normal, Present breath sounds normal


CARDIOVASCULAR





Cardiovascular:  Present regular rhythm, Present heart sounds normal, Present 

capillary refill normal, Present normal rate


GASTROINTESTINAL





Abdominal:  Present soft, Present nontender, Present bowel sounds normal


GENITOURINARY





Genitourinary:  Present exam deferred


SKIN


Skin:  Present warm, Present dry


MUSCULOSKELETAL





Musculoskeletal:  Present ROM normal, Present other (1-2 cm circular ulcer noted

over left dorsal hand, well demarcated wound, no superimposed cellulitis, no 

crepitus, no discharge)


NEUROLOGICAL





Neurological:  Present alert, Present oriented x 3, Present no gross motor or 

sensory deficits


PSYCHOLOGICAL


Psychological:  Present mood/affect normal, Present judgement normal





Results


Laboratory


Result Diagram:  


11/21/20 0757                                                                   

            11/21/20 0757





Laboratory





Laboratory Tests








Test


 11/21/20


07:57


 


White Blood Count


 4.98 x10e3/uL


(4.8-10.8)


 


Red Blood Count


 3.69 x10e6/uL


(4.3-5.7)


 


Hemoglobin


 11.5 g/dL


(14.0-18.0)


 


Hematocrit


 34.4 %


(38.2-49.6)


 


Mean Corpuscular Volume


 93.2 fL


(81-99)


 


Mean Corpuscular Hemoglobin


 31.2 pg


(28-32)


 


Mean Corpuscular Hemoglobin


Concent 33.4 g/dL


(31-35)


 


Red Cell Distribution Width


 13.0 %


(11.7-14.4)


 


Platelet Count


 206 x10e3/uL


(140-360)


 


Neutrophils (%) (Auto)


 61.7 %


(38.7-80.0)


 


Lymphocytes (%) (Auto)


 24.3 %


(18.0-39.1)


 


Monocytes (%) (Auto)


 9.6  %


(4.4-11.3)


 


Eosinophils (%) (Auto)


 3.2 %


(0.0-6.0)


 


Basophils (%) (Auto)


 1.0 %


(0.0-1.0)


 


Neutrophils # (Auto) 3.1 (2.1-6.9) 


 


Lymphocytes # (Auto) 1.2 (1.0-3.2) 


 


Monocytes # (Auto) 0.5 (0.2-0.8) 


 


Eosinophils # (Auto) 0.2 (0.0-0.4) 


 


Basophils # (Auto) 0.1 (0.0-0.1) 


 


Absolute Immature Granulocyte


(auto 0.01 x10e3/uL


(0-0.1)


 


Erythrocyte Sedimentation Rate


 22 mm/hr


(0-13)


 


Sodium Level


 144 mmol/L


(136-145)


 


Potassium Level


 3.8 mmol/L


(3.5-5.1)


 


Chloride Level


 107 mmol/L


()


 


Carbon Dioxide Level


 26 mmol/L


(22-29)


 


Anion Gap


 14.8 mmol/L


(8-16)


 


Blood Urea Nitrogen


 23 mg/dL


(7-26)


 


Creatinine


 1.10 mg/dL


(0.72-1.25)


 


Estimat Glomerular Filtration


Rate > 60 ML/MIN


(60-)


 


BUN/Creatinine Ratio 21 (6-25) 


 


Glucose Level


 165 mg/dL


()


 


Calcium Level


 9.3 mg/dL


(8.4-10.2)


 


Total Bilirubin


 0.4 mg/dL


(0.2-1.2)


 


Aspartate Amino Transf


(AST/SGOT) 9 IU/L (5-34) 





 


Alanine Aminotransferase


(ALT/SGPT) 8 IU/L (0-55) 





 


Alkaline Phosphatase


 67 IU/L


()


 


Total Protein


 6.9 g/dL


(6.5-8.1)


 


Albumin


 3.8 g/dL


(3.5-5.0)


 


Globulin


 3.1 g/dL


(2.3-3.5)


 


Albumin/Globulin Ratio 1.2 (0.8-2.0) 








Lab results reviewed:  Yes





Imaging


Imaging results reviewed:  Yes


Impressions


IMPRESSION: 





No osseous abnormality identified.





Signed by: Cherelle Morris MD on 11/21/2020 8:36 AM





Assessment & Plan


Medical Decision Making


MDM


75-year-old male arrived to the ED with concerns of a chronic skin ulcer over 

his left hand. Lab work and imaging unremarkable the ED. My suspicion for osteo-

myelitis is low at this time, patient was treated with Betadine and Hibiclens 

soak and encouraged to continue antibiotics as prescribed by his PCP.





Assessment & Plan


Final Impression:  


(1) Skin ulcer of hand


Depart Disposition:  HOME, SELF-CARE


Last Vital Signs











  Date Time  Temp Pulse Resp B/P (MAP) Pulse Ox O2 Delivery O2 Flow Rate FiO2


 


11/21/20 07:34 96.8 65 18 161/73 98 Room Air  








Home Meds


Reported Medications


Hydralazine Hcl (HYDRALAZINE HCL) 25 Mg Tab, 25 MG PO DAILY, TAB


   10/7/20


Isosorbide Mononitrate (ISOSORBIDE MONONITRATE ER) 30 Mg Tab.er.24h, 30 MG PO 

BID, #30 TAB


   7/8/20


Hydrochlorothiazide (HYDROCHLOROTHIAZIDE) 25 Mg Tablet, 12.5 MG PO DAILY, #30 

TAB


   7/8/20


Dapagliflozin Propanediol (Farxiga) 10 Mg Tablet, 10 MG PO DAILY


   7/8/20


Clopidogrel Bisulfate (CLOPIDOGREL) 75 Mg Tablet, 75 MG PO DAILY, #30 TAB


   1/2/19


Quinapril Hcl (QUINAPRIL HCL) 20 Mg Tablet, 40 MG PO BID, #30 TAB


   2/27/18


Pravastatin Sodium (PRAVASTATIN SODIUM) 10 Mg Tablet, 20 MG PO DAILY


   2/11/18


Amlodipine Besylate (NORVASC) 5 Mg Tab, 10 MG PO DAILY, #30 TAB


   2/11/18


Fenofibrate Nanocrystallized (FENOFIBRATE) 145 Mg Tablet, 1 TAB PO DAILY


   2/11/18


Glipizide (GLIPIZIDE) 5 Mg Tablet, 5 MG PO BID, TAB


   2/11/18


Metformin Hcl (METFORMIN HCL) 500 Mg Tablet, 1000 MG PO BID, #60 TAB


   2/11/18


Gabapentin (GABAPENTIN) 300 Mg Capsule, 300 MG PO BID, #60 CAP


   2/11/18


Medications in the ED





Acetaminophen/ Hydrocodone Bitart 1 ea ONCE  PRN PO MODERATE PAIN (4-6);  Start 

11/21/20 at 09:00;  Stop 11/28/20 at 08:59;  Status HEIKE DAVISON DO            Nov 21, 2020 09:12

## 2020-11-26 ENCOUNTER — HOSPITAL ENCOUNTER (EMERGENCY)
Dept: HOSPITAL 88 - ER | Age: 75
Discharge: HOME | End: 2020-11-26
Payer: MEDICARE

## 2020-11-26 VITALS — BODY MASS INDEX: 27.85 KG/M2 | WEIGHT: 188 LBS | HEIGHT: 69 IN

## 2020-11-26 DIAGNOSIS — I10: ICD-10-CM

## 2020-11-26 DIAGNOSIS — E78.5: ICD-10-CM

## 2020-11-26 DIAGNOSIS — K21.9: ICD-10-CM

## 2020-11-26 DIAGNOSIS — L98.499: Primary | ICD-10-CM

## 2020-11-26 DIAGNOSIS — E11.9: ICD-10-CM

## 2020-11-26 DIAGNOSIS — I25.10: ICD-10-CM

## 2020-11-26 PROCEDURE — 99282 EMERGENCY DEPT VISIT SF MDM: CPT

## 2021-02-12 ENCOUNTER — HOSPITAL ENCOUNTER (OUTPATIENT)
Dept: HOSPITAL 88 - CATH LAB | Age: 76
Discharge: HOME | End: 2021-02-12
Attending: INTERNAL MEDICINE
Payer: MEDICARE

## 2021-02-12 ENCOUNTER — HOSPITAL ENCOUNTER (OUTPATIENT)
Dept: HOSPITAL 88 - MED/SURG | Age: 76
Setting detail: OBSERVATION
LOS: 1 days | Discharge: HOME | End: 2021-02-13
Attending: INTERNAL MEDICINE | Admitting: INTERNAL MEDICINE
Payer: MEDICARE

## 2021-02-12 VITALS — BODY MASS INDEX: 27.4 KG/M2 | WEIGHT: 185 LBS | HEIGHT: 69 IN

## 2021-02-12 VITALS — SYSTOLIC BLOOD PRESSURE: 109 MMHG | DIASTOLIC BLOOD PRESSURE: 66 MMHG

## 2021-02-12 VITALS — SYSTOLIC BLOOD PRESSURE: 123 MMHG | DIASTOLIC BLOOD PRESSURE: 52 MMHG

## 2021-02-12 VITALS — SYSTOLIC BLOOD PRESSURE: 129 MMHG | DIASTOLIC BLOOD PRESSURE: 64 MMHG

## 2021-02-12 VITALS — DIASTOLIC BLOOD PRESSURE: 53 MMHG | SYSTOLIC BLOOD PRESSURE: 122 MMHG

## 2021-02-12 VITALS — DIASTOLIC BLOOD PRESSURE: 51 MMHG | SYSTOLIC BLOOD PRESSURE: 129 MMHG

## 2021-02-12 VITALS — HEIGHT: 69 IN | WEIGHT: 185 LBS | BODY MASS INDEX: 27.4 KG/M2

## 2021-02-12 VITALS — SYSTOLIC BLOOD PRESSURE: 122 MMHG | DIASTOLIC BLOOD PRESSURE: 53 MMHG

## 2021-02-12 VITALS — SYSTOLIC BLOOD PRESSURE: 125 MMHG | DIASTOLIC BLOOD PRESSURE: 75 MMHG

## 2021-02-12 VITALS — SYSTOLIC BLOOD PRESSURE: 133 MMHG | DIASTOLIC BLOOD PRESSURE: 52 MMHG

## 2021-02-12 VITALS — SYSTOLIC BLOOD PRESSURE: 158 MMHG | DIASTOLIC BLOOD PRESSURE: 77 MMHG

## 2021-02-12 DIAGNOSIS — K21.9: ICD-10-CM

## 2021-02-12 DIAGNOSIS — I70.213: ICD-10-CM

## 2021-02-12 DIAGNOSIS — L98.499: ICD-10-CM

## 2021-02-12 DIAGNOSIS — I70.25: Primary | ICD-10-CM

## 2021-02-12 DIAGNOSIS — D64.89: ICD-10-CM

## 2021-02-12 DIAGNOSIS — Z20.822: ICD-10-CM

## 2021-02-12 DIAGNOSIS — E11.9: ICD-10-CM

## 2021-02-12 DIAGNOSIS — E78.5: ICD-10-CM

## 2021-02-12 DIAGNOSIS — I11.9: ICD-10-CM

## 2021-02-12 DIAGNOSIS — I35.0: ICD-10-CM

## 2021-02-12 DIAGNOSIS — I75.023: ICD-10-CM

## 2021-02-12 DIAGNOSIS — I25.10: ICD-10-CM

## 2021-02-12 DIAGNOSIS — I25.10: Primary | ICD-10-CM

## 2021-02-12 DIAGNOSIS — Z95.5: ICD-10-CM

## 2021-02-12 DIAGNOSIS — Z01.812: ICD-10-CM

## 2021-02-12 DIAGNOSIS — I70.92: ICD-10-CM

## 2021-02-12 PROCEDURE — 36247 INS CATH ABD/L-EXT ART 3RD: CPT

## 2021-02-12 PROCEDURE — 99153 MOD SED SAME PHYS/QHP EA: CPT

## 2021-02-12 PROCEDURE — 82948 REAGENT STRIP/BLOOD GLUCOSE: CPT

## 2021-02-12 PROCEDURE — 99152 MOD SED SAME PHYS/QHP 5/>YRS: CPT

## 2021-02-12 PROCEDURE — 37230: CPT

## 2021-02-12 PROCEDURE — 37224: CPT

## 2021-02-12 PROCEDURE — 36415 COLL VENOUS BLD VENIPUNCTURE: CPT

## 2021-02-12 PROCEDURE — 37228: CPT

## 2021-02-12 PROCEDURE — 75710 ARTERY X-RAYS ARM/LEG: CPT

## 2021-02-12 RX ADMIN — SODIUM CHLORIDE SCH MLS/HR: 9 INJECTION, SOLUTION INTRAVENOUS at 19:45

## 2021-02-13 VITALS — DIASTOLIC BLOOD PRESSURE: 51 MMHG | SYSTOLIC BLOOD PRESSURE: 124 MMHG

## 2021-02-13 VITALS — DIASTOLIC BLOOD PRESSURE: 58 MMHG | SYSTOLIC BLOOD PRESSURE: 111 MMHG

## 2021-02-13 VITALS — DIASTOLIC BLOOD PRESSURE: 57 MMHG | SYSTOLIC BLOOD PRESSURE: 125 MMHG

## 2021-02-13 VITALS — SYSTOLIC BLOOD PRESSURE: 124 MMHG | DIASTOLIC BLOOD PRESSURE: 51 MMHG

## 2021-02-13 RX ADMIN — SODIUM CHLORIDE SCH MLS/HR: 9 INJECTION, SOLUTION INTRAVENOUS at 04:02

## 2021-02-13 RX ADMIN — HYDROCODONE BITARTRATE AND ACETAMINOPHEN PRN EA: 5; 325 TABLET ORAL at 08:35

## 2021-02-13 RX ADMIN — HYDROCODONE BITARTRATE AND ACETAMINOPHEN PRN EA: 5; 325 TABLET ORAL at 04:45

## 2021-02-16 ENCOUNTER — HOSPITAL ENCOUNTER (EMERGENCY)
Dept: HOSPITAL 88 - ER | Age: 76
Discharge: HOME | End: 2021-02-16
Payer: MEDICARE

## 2021-02-16 VITALS — WEIGHT: 185 LBS | HEIGHT: 69 IN | BODY MASS INDEX: 27.4 KG/M2

## 2021-02-16 DIAGNOSIS — K21.9: ICD-10-CM

## 2021-02-16 DIAGNOSIS — E11.9: ICD-10-CM

## 2021-02-16 DIAGNOSIS — I10: ICD-10-CM

## 2021-02-16 DIAGNOSIS — L03.031: Primary | ICD-10-CM

## 2021-02-16 DIAGNOSIS — E78.5: ICD-10-CM

## 2021-02-16 DIAGNOSIS — I25.10: ICD-10-CM

## 2021-02-16 LAB
ALBUMIN SERPL-MCNC: 4.1 G/DL (ref 3.5–5)
ALBUMIN/GLOB SERPL: 1.2 {RATIO} (ref 0.8–2)
ALP SERPL-CCNC: 45 IU/L (ref 40–150)
ALT SERPL-CCNC: 10 IU/L (ref 0–55)
ANION GAP SERPL CALC-SCNC: 14 MMOL/L (ref 8–16)
BASOPHILS # BLD AUTO: 0 10*3/UL (ref 0–0.1)
BASOPHILS NFR BLD AUTO: 0.6 % (ref 0–1)
BUN SERPL-MCNC: 21 MG/DL (ref 7–26)
BUN/CREAT SERPL: 19 (ref 6–25)
CALCIUM SERPL-MCNC: 9.6 MG/DL (ref 8.4–10.2)
CHLORIDE SERPL-SCNC: 107 MMOL/L (ref 98–107)
CO2 SERPL-SCNC: 25 MMOL/L (ref 22–29)
DEPRECATED NEUTROPHILS # BLD AUTO: 3.9 10*3/UL (ref 2.1–6.9)
EGFRCR SERPLBLD CKD-EPI 2021: > 60 ML/MIN (ref 60–?)
EOSINOPHIL # BLD AUTO: 0.2 10*3/UL (ref 0–0.4)
EOSINOPHIL NFR BLD AUTO: 3.7 % (ref 0–6)
ERYTHROCYTE [DISTWIDTH] IN CORD BLOOD: 13.3 % (ref 11.7–14.4)
GLOBULIN PLAS-MCNC: 3.4 G/DL (ref 2.3–3.5)
GLUCOSE SERPLBLD-MCNC: 108 MG/DL (ref 74–118)
HCT VFR BLD AUTO: 35.4 % (ref 38.2–49.6)
HGB BLD-MCNC: 11.6 G/DL (ref 14–18)
LYMPHOCYTES # BLD: 1.7 10*3/UL (ref 1–3.2)
LYMPHOCYTES NFR BLD AUTO: 26.1 % (ref 18–39.1)
MCH RBC QN AUTO: 31.4 PG (ref 28–32)
MCHC RBC AUTO-ENTMCNC: 32.8 G/DL (ref 31–35)
MCV RBC AUTO: 95.7 FL (ref 81–99)
MONOCYTES # BLD AUTO: 0.6 10*3/UL (ref 0.2–0.8)
MONOCYTES NFR BLD AUTO: 9.2 % (ref 4.4–11.3)
NEUTS SEG NFR BLD AUTO: 60.2 % (ref 38.7–80)
PLATELET # BLD AUTO: 210 X10E3/UL (ref 140–360)
POTASSIUM SERPL-SCNC: 4 MMOL/L (ref 3.5–5.1)
RBC # BLD AUTO: 3.7 X10E6/UL (ref 4.3–5.7)
SODIUM SERPL-SCNC: 142 MMOL/L (ref 136–145)

## 2021-02-16 PROCEDURE — 85025 COMPLETE CBC W/AUTO DIFF WBC: CPT

## 2021-02-16 PROCEDURE — 99284 EMERGENCY DEPT VISIT MOD MDM: CPT

## 2021-02-16 PROCEDURE — 80053 COMPREHEN METABOLIC PANEL: CPT

## 2021-02-16 PROCEDURE — 36415 COLL VENOUS BLD VENIPUNCTURE: CPT

## 2021-04-08 ENCOUNTER — HOSPITAL ENCOUNTER (EMERGENCY)
Dept: HOSPITAL 88 - ER | Age: 76
Discharge: HOME | End: 2021-04-08
Payer: MEDICARE

## 2021-04-08 VITALS — BODY MASS INDEX: 27.4 KG/M2 | HEIGHT: 69 IN | WEIGHT: 185 LBS

## 2021-04-08 DIAGNOSIS — R19.7: Primary | ICD-10-CM

## 2021-04-08 DIAGNOSIS — E11.9: ICD-10-CM

## 2021-04-08 DIAGNOSIS — E78.5: ICD-10-CM

## 2021-04-08 DIAGNOSIS — Z95.2: ICD-10-CM

## 2021-04-08 DIAGNOSIS — K21.9: ICD-10-CM

## 2021-04-08 DIAGNOSIS — I25.10: ICD-10-CM

## 2021-04-08 DIAGNOSIS — I10: ICD-10-CM

## 2021-04-08 PROCEDURE — 99282 EMERGENCY DEPT VISIT SF MDM: CPT

## 2021-08-20 ENCOUNTER — HOSPITAL ENCOUNTER (EMERGENCY)
Dept: HOSPITAL 88 - ER | Age: 76
Discharge: HOME | End: 2021-08-20
Payer: MEDICARE

## 2021-08-20 VITALS — BODY MASS INDEX: 27.4 KG/M2 | WEIGHT: 185 LBS | HEIGHT: 69 IN

## 2021-08-20 VITALS — DIASTOLIC BLOOD PRESSURE: 72 MMHG | SYSTOLIC BLOOD PRESSURE: 134 MMHG

## 2021-08-20 DIAGNOSIS — E11.9: ICD-10-CM

## 2021-08-20 DIAGNOSIS — Z95.2: ICD-10-CM

## 2021-08-20 DIAGNOSIS — K21.9: ICD-10-CM

## 2021-08-20 DIAGNOSIS — I10: ICD-10-CM

## 2021-08-20 DIAGNOSIS — E78.5: ICD-10-CM

## 2021-08-20 DIAGNOSIS — U07.1: Primary | ICD-10-CM

## 2021-08-20 DIAGNOSIS — I25.10: ICD-10-CM

## 2021-08-20 LAB
ALBUMIN SERPL-MCNC: 3.3 G/DL (ref 3.5–5)
ALBUMIN/GLOB SERPL: 0.7 {RATIO} (ref 0.8–2)
ALP SERPL-CCNC: 53 IU/L (ref 40–150)
ALT SERPL-CCNC: 16 IU/L (ref 0–55)
ANION GAP SERPL CALC-SCNC: 18.9 MMOL/L (ref 8–16)
BASOPHILS # BLD AUTO: 0 10*3/UL (ref 0–0.1)
BASOPHILS NFR BLD AUTO: 0.3 % (ref 0–1)
BUN SERPL-MCNC: 30 MG/DL (ref 7–26)
BUN/CREAT SERPL: 19 (ref 6–25)
CALCIUM SERPL-MCNC: 9.2 MG/DL (ref 8.4–10.2)
CHLORIDE SERPL-SCNC: 100 MMOL/L (ref 98–107)
CO2 SERPL-SCNC: 23 MMOL/L (ref 22–29)
DEPRECATED NEUTROPHILS # BLD AUTO: 4.6 10*3/UL (ref 2.1–6.9)
EGFRCR SERPLBLD CKD-EPI 2021: 42 ML/MIN (ref 60–?)
EOSINOPHIL # BLD AUTO: 0 10*3/UL (ref 0–0.4)
EOSINOPHIL NFR BLD AUTO: 0.2 % (ref 0–6)
ERYTHROCYTE [DISTWIDTH] IN CORD BLOOD: 12.6 % (ref 11.7–14.4)
GLOBULIN PLAS-MCNC: 4.5 G/DL (ref 2.3–3.5)
GLUCOSE SERPLBLD-MCNC: 136 MG/DL (ref 74–118)
HCT VFR BLD AUTO: 34.8 % (ref 38.2–49.6)
HGB BLD-MCNC: 11.4 G/DL (ref 14–18)
LYMPHOCYTES # BLD: 1 10*3/UL (ref 1–3.2)
LYMPHOCYTES NFR BLD AUTO: 16.7 % (ref 18–39.1)
MCH RBC QN AUTO: 30.7 PG (ref 28–32)
MCHC RBC AUTO-ENTMCNC: 32.8 G/DL (ref 31–35)
MCV RBC AUTO: 93.8 FL (ref 81–99)
MONOCYTES # BLD AUTO: 0.4 10*3/UL (ref 0.2–0.8)
MONOCYTES NFR BLD AUTO: 6.9 % (ref 4.4–11.3)
NEUTS SEG NFR BLD AUTO: 75.6 % (ref 38.7–80)
PLATELET # BLD AUTO: 168 X10E3/UL (ref 140–360)
POTASSIUM SERPL-SCNC: 3.9 MMOL/L (ref 3.5–5.1)
RBC # BLD AUTO: 3.71 X10E6/UL (ref 4.3–5.7)
SODIUM SERPL-SCNC: 138 MMOL/L (ref 136–145)

## 2021-08-20 PROCEDURE — 85025 COMPLETE CBC W/AUTO DIFF WBC: CPT

## 2021-08-20 PROCEDURE — 99283 EMERGENCY DEPT VISIT LOW MDM: CPT

## 2021-08-20 PROCEDURE — 71045 X-RAY EXAM CHEST 1 VIEW: CPT

## 2021-08-20 PROCEDURE — 36415 COLL VENOUS BLD VENIPUNCTURE: CPT

## 2021-08-20 PROCEDURE — 80053 COMPREHEN METABOLIC PANEL: CPT

## 2022-01-21 ENCOUNTER — HOSPITAL ENCOUNTER (OUTPATIENT)
Dept: HOSPITAL 88 - LAB | Age: 77
End: 2022-01-21
Attending: INTERNAL MEDICINE
Payer: MEDICARE

## 2022-01-21 DIAGNOSIS — R22.43: ICD-10-CM

## 2022-01-21 DIAGNOSIS — I11.0: Primary | ICD-10-CM

## 2022-01-21 DIAGNOSIS — I35.0: ICD-10-CM

## 2022-01-21 DIAGNOSIS — D51.9: ICD-10-CM

## 2022-01-21 LAB
ALBUMIN SERPL-MCNC: 3.8 G/DL (ref 3.5–5)
ALBUMIN/GLOB SERPL: 1.2 {RATIO} (ref 0.8–2)
ALP SERPL-CCNC: 59 IU/L (ref 40–150)
ALT SERPL-CCNC: 8 IU/L (ref 0–55)
ANION GAP SERPL CALC-SCNC: 11.9 MMOL/L (ref 8–16)
BASOPHILS # BLD AUTO: 0 10*3/UL (ref 0–0.1)
BASOPHILS NFR BLD AUTO: 0.8 % (ref 0–1)
BUN SERPL-MCNC: 19 MG/DL (ref 7–26)
BUN/CREAT SERPL: 15 (ref 6–25)
CALCIUM SERPL-MCNC: 9.8 MG/DL (ref 8.4–10.2)
CHLORIDE SERPL-SCNC: 111 MMOL/L (ref 98–107)
CO2 SERPL-SCNC: 23 MMOL/L (ref 22–29)
DEPRECATED INR PLAS: 1.26
DEPRECATED NEUTROPHILS # BLD AUTO: 3 10*3/UL (ref 2.1–6.9)
EGFRCR SERPLBLD CKD-EPI 2021: 55 ML/MIN (ref 60–?)
EOSINOPHIL # BLD AUTO: 0.3 10*3/UL (ref 0–0.4)
EOSINOPHIL NFR BLD AUTO: 6.6 % (ref 0–6)
ERYTHROCYTE [DISTWIDTH] IN CORD BLOOD: 13.8 % (ref 11.7–14.4)
GLOBULIN PLAS-MCNC: 3.1 G/DL (ref 2.3–3.5)
GLUCOSE SERPLBLD-MCNC: 137 MG/DL (ref 74–118)
HCT VFR BLD AUTO: 33 % (ref 38.2–49.6)
HGB BLD-MCNC: 10.2 G/DL (ref 14–18)
IRON SERPL-MCNC: 43 UG/DL (ref 65–175)
LYMPHOCYTES # BLD: 1 10*3/UL (ref 1–3.2)
LYMPHOCYTES NFR BLD AUTO: 20.5 % (ref 18–39.1)
MCH RBC QN AUTO: 30.4 PG (ref 28–32)
MCHC RBC AUTO-ENTMCNC: 30.9 G/DL (ref 31–35)
MCV RBC AUTO: 98.5 FL (ref 81–99)
MONOCYTES # BLD AUTO: 0.5 10*3/UL (ref 0.2–0.8)
MONOCYTES NFR BLD AUTO: 9.5 % (ref 4.4–11.3)
NEUTS SEG NFR BLD AUTO: 62.4 % (ref 38.7–80)
PLATELET # BLD AUTO: 142 X10E3/UL (ref 140–360)
POTASSIUM SERPL-SCNC: 3.9 MMOL/L (ref 3.5–5.1)
PROTHROMBIN TIME: 16.6 SECONDS (ref 11.9–14.5)
RBC # BLD AUTO: 3.35 X10E6/UL (ref 4.3–5.7)
SODIUM SERPL-SCNC: 142 MMOL/L (ref 136–145)

## 2022-01-21 PROCEDURE — 85610 PROTHROMBIN TIME: CPT

## 2022-01-21 PROCEDURE — 83540 ASSAY OF IRON: CPT

## 2022-01-21 PROCEDURE — 85025 COMPLETE CBC W/AUTO DIFF WBC: CPT

## 2022-01-21 PROCEDURE — 83880 ASSAY OF NATRIURETIC PEPTIDE: CPT

## 2022-01-21 PROCEDURE — 36415 COLL VENOUS BLD VENIPUNCTURE: CPT

## 2022-01-21 PROCEDURE — 80053 COMPREHEN METABOLIC PANEL: CPT

## 2022-03-07 ENCOUNTER — HOSPITAL ENCOUNTER (OUTPATIENT)
Dept: HOSPITAL 88 - US | Age: 77
End: 2022-03-07
Attending: INTERNAL MEDICINE
Payer: MEDICARE

## 2022-03-07 DIAGNOSIS — I15.0: ICD-10-CM

## 2022-03-07 DIAGNOSIS — I70.1: Primary | ICD-10-CM

## 2022-03-07 PROCEDURE — 76770 US EXAM ABDO BACK WALL COMP: CPT

## 2022-04-14 LAB
BASOPHILS # BLD AUTO: 0 10*3/UL (ref 0–0.1)
BASOPHILS NFR BLD AUTO: 0.8 % (ref 0–1)
DEPRECATED NEUTROPHILS # BLD AUTO: 2.7 10*3/UL (ref 2.1–6.9)
EOSINOPHIL # BLD AUTO: 0.2 10*3/UL (ref 0–0.4)
EOSINOPHIL NFR BLD AUTO: 4.2 % (ref 0–6)
ERYTHROCYTE [DISTWIDTH] IN CORD BLOOD: 13.4 % (ref 11.7–14.4)
HCT VFR BLD AUTO: 33.4 % (ref 38.2–49.6)
HGB BLD-MCNC: 11.2 G/DL (ref 14–18)
LYMPHOCYTES # BLD: 1.7 10*3/UL (ref 1–3.2)
LYMPHOCYTES NFR BLD AUTO: 32.9 % (ref 18–39.1)
MCH RBC QN AUTO: 31.6 PG (ref 28–32)
MCHC RBC AUTO-ENTMCNC: 33.5 G/DL (ref 31–35)
MCV RBC AUTO: 94.4 FL (ref 81–99)
MONOCYTES # BLD AUTO: 0.5 10*3/UL (ref 0.2–0.8)
MONOCYTES NFR BLD AUTO: 8.9 % (ref 4.4–11.3)
NEUTS SEG NFR BLD AUTO: 53 % (ref 38.7–80)
PLATELET # BLD AUTO: 174 X10E3/UL (ref 140–360)
RBC # BLD AUTO: 3.54 X10E6/UL (ref 4.3–5.7)

## 2022-04-19 ENCOUNTER — HOSPITAL ENCOUNTER (OUTPATIENT)
Dept: HOSPITAL 88 - OR | Age: 77
Discharge: HOME | End: 2022-04-19
Attending: INTERNAL MEDICINE
Payer: MEDICARE

## 2022-04-19 VITALS — DIASTOLIC BLOOD PRESSURE: 63 MMHG | SYSTOLIC BLOOD PRESSURE: 133 MMHG

## 2022-04-19 DIAGNOSIS — D12.4: ICD-10-CM

## 2022-04-19 DIAGNOSIS — Z09: Primary | ICD-10-CM

## 2022-04-19 DIAGNOSIS — Z01.812: ICD-10-CM

## 2022-04-19 DIAGNOSIS — M19.90: ICD-10-CM

## 2022-04-19 DIAGNOSIS — I10: ICD-10-CM

## 2022-04-19 DIAGNOSIS — Z79.02: ICD-10-CM

## 2022-04-19 DIAGNOSIS — I25.10: ICD-10-CM

## 2022-04-19 DIAGNOSIS — Z95.5: ICD-10-CM

## 2022-04-19 DIAGNOSIS — D64.9: ICD-10-CM

## 2022-04-19 DIAGNOSIS — K64.8: ICD-10-CM

## 2022-04-19 DIAGNOSIS — D12.5: ICD-10-CM

## 2022-04-19 DIAGNOSIS — Z79.899: ICD-10-CM

## 2022-04-19 DIAGNOSIS — Z20.822: ICD-10-CM

## 2022-04-19 DIAGNOSIS — Z79.4: ICD-10-CM

## 2022-04-19 DIAGNOSIS — E78.00: ICD-10-CM

## 2022-04-19 DIAGNOSIS — K59.00: ICD-10-CM

## 2022-04-19 DIAGNOSIS — E11.9: ICD-10-CM

## 2022-04-19 DIAGNOSIS — F41.9: ICD-10-CM

## 2022-04-19 DIAGNOSIS — Z71.3: ICD-10-CM

## 2022-04-19 PROCEDURE — 36415 COLL VENOUS BLD VENIPUNCTURE: CPT

## 2022-04-19 PROCEDURE — 45378 DIAGNOSTIC COLONOSCOPY: CPT

## 2022-04-19 PROCEDURE — 82948 REAGENT STRIP/BLOOD GLUCOSE: CPT

## 2022-04-19 PROCEDURE — 85025 COMPLETE CBC W/AUTO DIFF WBC: CPT

## 2022-04-19 PROCEDURE — 88305 TISSUE EXAM BY PATHOLOGIST: CPT

## 2022-04-19 PROCEDURE — 45380 COLONOSCOPY AND BIOPSY: CPT

## 2022-04-19 PROCEDURE — 45385 COLONOSCOPY W/LESION REMOVAL: CPT

## 2022-06-10 ENCOUNTER — HOSPITAL ENCOUNTER (INPATIENT)
Dept: HOSPITAL 88 - ER | Age: 77
LOS: 3 days | Discharge: HOME | DRG: 639 | End: 2022-06-13
Attending: INTERNAL MEDICINE | Admitting: INTERNAL MEDICINE
Payer: MEDICARE

## 2022-06-10 VITALS — DIASTOLIC BLOOD PRESSURE: 52 MMHG | SYSTOLIC BLOOD PRESSURE: 131 MMHG

## 2022-06-10 VITALS — WEIGHT: 189.31 LBS | BODY MASS INDEX: 28.04 KG/M2 | HEIGHT: 69 IN

## 2022-06-10 VITALS — SYSTOLIC BLOOD PRESSURE: 135 MMHG | DIASTOLIC BLOOD PRESSURE: 60 MMHG

## 2022-06-10 VITALS — DIASTOLIC BLOOD PRESSURE: 53 MMHG | SYSTOLIC BLOOD PRESSURE: 142 MMHG

## 2022-06-10 VITALS — DIASTOLIC BLOOD PRESSURE: 56 MMHG | SYSTOLIC BLOOD PRESSURE: 133 MMHG

## 2022-06-10 VITALS — DIASTOLIC BLOOD PRESSURE: 52 MMHG | SYSTOLIC BLOOD PRESSURE: 136 MMHG

## 2022-06-10 VITALS — DIASTOLIC BLOOD PRESSURE: 56 MMHG | SYSTOLIC BLOOD PRESSURE: 141 MMHG
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
## 2022-06-10 DIAGNOSIS — N18.9: ICD-10-CM

## 2022-06-10 DIAGNOSIS — I25.10: ICD-10-CM

## 2022-06-10 DIAGNOSIS — Z79.01: ICD-10-CM

## 2022-06-10 DIAGNOSIS — E78.5: ICD-10-CM

## 2022-06-10 DIAGNOSIS — I13.10: ICD-10-CM

## 2022-06-10 DIAGNOSIS — S50.312A: ICD-10-CM

## 2022-06-10 DIAGNOSIS — T38.3X5A: ICD-10-CM

## 2022-06-10 DIAGNOSIS — E11.649: Primary | ICD-10-CM

## 2022-06-10 DIAGNOSIS — D50.9: ICD-10-CM

## 2022-06-10 DIAGNOSIS — S80.212A: ICD-10-CM

## 2022-06-10 DIAGNOSIS — Z20.822: ICD-10-CM

## 2022-06-10 DIAGNOSIS — Z79.84: ICD-10-CM

## 2022-06-10 DIAGNOSIS — Z95.2: ICD-10-CM

## 2022-06-10 DIAGNOSIS — R44.1: ICD-10-CM
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
## 2022-06-10 DIAGNOSIS — Z96.651: ICD-10-CM

## 2022-06-10 DIAGNOSIS — E11.51: ICD-10-CM

## 2022-06-10 DIAGNOSIS — Z79.4: ICD-10-CM

## 2022-06-10 DIAGNOSIS — Z96.643: ICD-10-CM

## 2022-06-10 DIAGNOSIS — E11.22: ICD-10-CM
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
## 2022-06-10 DIAGNOSIS — R55: ICD-10-CM

## 2022-06-10 DIAGNOSIS — Z95.5: ICD-10-CM

## 2022-06-10 DIAGNOSIS — K21.9: ICD-10-CM

## 2022-06-10 LAB
ALBUMIN SERPL-MCNC: 3.8 G/DL (ref 3.5–5)
ALBUMIN/GLOB SERPL: 1.2 {RATIO} (ref 0.8–2)
ALP SERPL-CCNC: 46 IU/L (ref 40–150)
ALT SERPL-CCNC: 9 IU/L (ref 0–55)
ANION GAP SERPL CALC-SCNC: 15.1 MMOL/L (ref 8–16)
BACTERIA URNS QL MICRO: (no result) /HPF
BASOPHILS # BLD AUTO: 0 10*3/UL (ref 0–0.1)
BASOPHILS NFR BLD AUTO: 0.6 % (ref 0–1)
BUN SERPL-MCNC: 32 MG/DL (ref 7–26)
BUN/CREAT SERPL: 18 (ref 6–25)
CALCIUM SERPL-MCNC: 9.8 MG/DL (ref 8.4–10.2)
CHLORIDE SERPL-SCNC: 110 MMOL/L (ref 98–107)
CK MB SERPL-MCNC: 2.8 NG/ML (ref 0–5)
CK SERPL-CCNC: 97 IU/L (ref 30–200)
CLARITY UR: CLEAR
CO2 SERPL-SCNC: 23 MMOL/L (ref 22–29)
COLOR UR: COLORLESS
DEPRECATED APTT PLAS QN: 29.7 SECONDS (ref 23.8–35.5)
DEPRECATED INR PLAS: 1.11
DEPRECATED NEUTROPHILS # BLD AUTO: 5 10*3/UL (ref 2.1–6.9)
DEPRECATED RBC URNS MANUAL-ACNC: (no result) /HPF (ref 0–5)
EOSINOPHIL # BLD AUTO: 0.2 10*3/UL (ref 0–0.4)
EOSINOPHIL NFR BLD AUTO: 2.7 % (ref 0–6)
EPI CELLS URNS QL MICRO: (no result) /LPF
ERYTHROCYTE [DISTWIDTH] IN CORD BLOOD: 13.3 % (ref 11.7–14.4)
GLOBULIN PLAS-MCNC: 3.2 G/DL (ref 2.3–3.5)
GLUCOSE SERPLBLD-MCNC: 82 MG/DL (ref 74–118)
HCT VFR BLD AUTO: 34.4 % (ref 38.2–49.6)
HGB BLD-MCNC: 10.9 G/DL (ref 14–18)
KETONES UR QL STRIP.AUTO: NEGATIVE
LEUKOCYTE ESTERASE UR QL STRIP.AUTO: NEGATIVE
LYMPHOCYTES # BLD: 1.2 10*3/UL (ref 1–3.2)
LYMPHOCYTES NFR BLD AUTO: 17.1 % (ref 18–39.1)
MAGNESIUM SERPL-MCNC: 1.6 MG/DL (ref 1.3–2.1)
MCH RBC QN AUTO: 31.5 PG (ref 28–32)
MCHC RBC AUTO-ENTMCNC: 31.7 G/DL (ref 31–35)
MCV RBC AUTO: 99.4 FL (ref 81–99)
MONOCYTES # BLD AUTO: 0.5 10*3/UL (ref 0.2–0.8)
MONOCYTES NFR BLD AUTO: 7.4 % (ref 4.4–11.3)
NEUTS SEG NFR BLD AUTO: 71.9 % (ref 38.7–80)
NITRITE UR QL STRIP.AUTO: NEGATIVE
PLATELET # BLD AUTO: 173 X10E3/UL (ref 140–360)
POTASSIUM SERPL-SCNC: 3.1 MMOL/L (ref 3.5–5.1)
PROT UR QL STRIP.AUTO: (no result)
PROTHROMBIN TIME: 15.3 SECONDS (ref 11.9–14.5)
RBC # BLD AUTO: 3.46 X10E6/UL (ref 4.3–5.7)
SODIUM SERPL-SCNC: 145 MMOL/L (ref 136–145)
SP GR UR STRIP: 1.01 (ref 1.01–1.02)
UROBILINOGEN UR STRIP-MCNC: 0.2 MG/DL (ref 0.2–1)
WBC #/AREA URNS HPF: (no result) /HPF (ref 0–5)

## 2022-06-10 PROCEDURE — 80053 COMPREHEN METABOLIC PANEL: CPT

## 2022-06-10 PROCEDURE — 84484 ASSAY OF TROPONIN QUANT: CPT

## 2022-06-10 PROCEDURE — 83880 ASSAY OF NATRIURETIC PEPTIDE: CPT

## 2022-06-10 PROCEDURE — 70450 CT HEAD/BRAIN W/O DYE: CPT

## 2022-06-10 PROCEDURE — 36415 COLL VENOUS BLD VENIPUNCTURE: CPT

## 2022-06-10 PROCEDURE — 71045 X-RAY EXAM CHEST 1 VIEW: CPT

## 2022-06-10 PROCEDURE — 85610 PROTHROMBIN TIME: CPT

## 2022-06-10 PROCEDURE — 83735 ASSAY OF MAGNESIUM: CPT

## 2022-06-10 PROCEDURE — 80048 BASIC METABOLIC PNL TOTAL CA: CPT

## 2022-06-10 PROCEDURE — 82948 REAGENT STRIP/BLOOD GLUCOSE: CPT

## 2022-06-10 PROCEDURE — 94799 UNLISTED PULMONARY SVC/PX: CPT

## 2022-06-10 PROCEDURE — 85025 COMPLETE CBC W/AUTO DIFF WBC: CPT

## 2022-06-10 PROCEDURE — 82553 CREATINE MB FRACTION: CPT

## 2022-06-10 PROCEDURE — 96372 THER/PROPH/DIAG INJ SC/IM: CPT

## 2022-06-10 PROCEDURE — 93005 ELECTROCARDIOGRAM TRACING: CPT

## 2022-06-10 PROCEDURE — 72125 CT NECK SPINE W/O DYE: CPT

## 2022-06-10 PROCEDURE — 99285 EMERGENCY DEPT VISIT HI MDM: CPT

## 2022-06-10 PROCEDURE — 93880 EXTRACRANIAL BILAT STUDY: CPT

## 2022-06-10 PROCEDURE — 85730 THROMBOPLASTIN TIME PARTIAL: CPT

## 2022-06-10 PROCEDURE — 83036 HEMOGLOBIN GLYCOSYLATED A1C: CPT

## 2022-06-10 PROCEDURE — 81001 URINALYSIS AUTO W/SCOPE: CPT

## 2022-06-10 PROCEDURE — 82550 ASSAY OF CK (CPK): CPT

## 2022-06-10 PROCEDURE — 87086 URINE CULTURE/COLONY COUNT: CPT

## 2022-06-10 RX ADMIN — DEXTROSE AND SODIUM CHLORIDE ONE MLS/HR: 5; 900 INJECTION, SOLUTION INTRAVENOUS at 16:10

## 2022-06-10 RX ADMIN — SODIUM CHLORIDE SCH MLS/HR: 9 INJECTION, SOLUTION INTRAVENOUS at 19:14

## 2022-06-10 RX ADMIN — DEXTROSE MONOHYDRATE SCH MLS/HR: 50 INJECTION, SOLUTION INTRAVENOUS at 16:41

## 2022-06-10 RX ADMIN — DEXTROSE AND SODIUM CHLORIDE ONE MLS/HR: 5; 900 INJECTION, SOLUTION INTRAVENOUS at 16:40

## 2022-06-11 VITALS — SYSTOLIC BLOOD PRESSURE: 153 MMHG | DIASTOLIC BLOOD PRESSURE: 48 MMHG

## 2022-06-11 VITALS — DIASTOLIC BLOOD PRESSURE: 57 MMHG | SYSTOLIC BLOOD PRESSURE: 151 MMHG

## 2022-06-11 VITALS — SYSTOLIC BLOOD PRESSURE: 165 MMHG | DIASTOLIC BLOOD PRESSURE: 51 MMHG

## 2022-06-11 VITALS — DIASTOLIC BLOOD PRESSURE: 52 MMHG | SYSTOLIC BLOOD PRESSURE: 152 MMHG

## 2022-06-11 VITALS — SYSTOLIC BLOOD PRESSURE: 159 MMHG | DIASTOLIC BLOOD PRESSURE: 53 MMHG

## 2022-06-11 VITALS — SYSTOLIC BLOOD PRESSURE: 139 MMHG | DIASTOLIC BLOOD PRESSURE: 60 MMHG

## 2022-06-11 VITALS — DIASTOLIC BLOOD PRESSURE: 82 MMHG | SYSTOLIC BLOOD PRESSURE: 149 MMHG

## 2022-06-11 VITALS — DIASTOLIC BLOOD PRESSURE: 54 MMHG | SYSTOLIC BLOOD PRESSURE: 139 MMHG

## 2022-06-11 VITALS — SYSTOLIC BLOOD PRESSURE: 153 MMHG | DIASTOLIC BLOOD PRESSURE: 51 MMHG

## 2022-06-11 VITALS — SYSTOLIC BLOOD PRESSURE: 154 MMHG | DIASTOLIC BLOOD PRESSURE: 57 MMHG

## 2022-06-11 VITALS — SYSTOLIC BLOOD PRESSURE: 171 MMHG | DIASTOLIC BLOOD PRESSURE: 54 MMHG

## 2022-06-11 VITALS — DIASTOLIC BLOOD PRESSURE: 55 MMHG | SYSTOLIC BLOOD PRESSURE: 144 MMHG

## 2022-06-11 VITALS — DIASTOLIC BLOOD PRESSURE: 53 MMHG | SYSTOLIC BLOOD PRESSURE: 157 MMHG

## 2022-06-11 LAB
ALBUMIN SERPL-MCNC: 3.4 G/DL (ref 3.5–5)
ALBUMIN/GLOB SERPL: 1.3 {RATIO} (ref 0.8–2)
ALP SERPL-CCNC: 45 IU/L (ref 40–150)
ALT SERPL-CCNC: 8 IU/L (ref 0–55)
ANION GAP SERPL CALC-SCNC: 12.2 MMOL/L (ref 8–16)
BASOPHILS # BLD AUTO: 0 10*3/UL (ref 0–0.1)
BASOPHILS NFR BLD AUTO: 0.4 % (ref 0–1)
BUN SERPL-MCNC: 24 MG/DL (ref 7–26)
BUN/CREAT SERPL: 20 (ref 6–25)
CALCIUM SERPL-MCNC: 9 MG/DL (ref 8.4–10.2)
CHLORIDE SERPL-SCNC: 109 MMOL/L (ref 98–107)
CK MB SERPL-MCNC: 3.5 NG/ML (ref 0–5)
CK MB SERPL-MCNC: 4 NG/ML (ref 0–5)
CK SERPL-CCNC: 115 IU/L (ref 30–200)
CK SERPL-CCNC: 135 IU/L (ref 30–200)
CO2 SERPL-SCNC: 22 MMOL/L (ref 22–29)
DEPRECATED NEUTROPHILS # BLD AUTO: 3.1 10*3/UL (ref 2.1–6.9)
EOSINOPHIL # BLD AUTO: 0.2 10*3/UL (ref 0–0.4)
EOSINOPHIL NFR BLD AUTO: 3.6 % (ref 0–6)
ERYTHROCYTE [DISTWIDTH] IN CORD BLOOD: 13.1 % (ref 11.7–14.4)
GLOBULIN PLAS-MCNC: 2.6 G/DL (ref 2.3–3.5)
GLUCOSE SERPLBLD-MCNC: 98 MG/DL (ref 74–118)
HCT VFR BLD AUTO: 32.2 % (ref 38.2–49.6)
HGB BLD-MCNC: 10.4 G/DL (ref 14–18)
LYMPHOCYTES # BLD: 1.6 10*3/UL (ref 1–3.2)
LYMPHOCYTES NFR BLD AUTO: 29.3 % (ref 18–39.1)
MCH RBC QN AUTO: 31.8 PG (ref 28–32)
MCHC RBC AUTO-ENTMCNC: 32.3 G/DL (ref 31–35)
MCV RBC AUTO: 98.5 FL (ref 81–99)
MONOCYTES # BLD AUTO: 0.5 10*3/UL (ref 0.2–0.8)
MONOCYTES NFR BLD AUTO: 8.8 % (ref 4.4–11.3)
NEUTS SEG NFR BLD AUTO: 57.7 % (ref 38.7–80)
PLATELET # BLD AUTO: 164 X10E3/UL (ref 140–360)
POTASSIUM SERPL-SCNC: 3.2 MMOL/L (ref 3.5–5.1)
RBC # BLD AUTO: 3.27 X10E6/UL (ref 4.3–5.7)
SODIUM SERPL-SCNC: 140 MMOL/L (ref 136–145)

## 2022-06-11 RX ADMIN — SIMVASTATIN SCH MG: 20 TABLET, FILM COATED ORAL at 21:39

## 2022-06-11 RX ADMIN — INSULIN HUMAN SCH UNIT: 100 INJECTION, SOLUTION PARENTERAL at 16:03

## 2022-06-11 RX ADMIN — METOPROLOL TARTRATE SCH MG: 25 TABLET, FILM COATED ORAL at 16:02

## 2022-06-11 RX ADMIN — INSULIN HUMAN SCH UNIT: 100 INJECTION, SOLUTION PARENTERAL at 21:30

## 2022-06-11 RX ADMIN — DEXTROSE MONOHYDRATE SCH MLS/HR: 50 INJECTION, SOLUTION INTRAVENOUS at 03:17

## 2022-06-11 RX ADMIN — PANTOPRAZOLE SODIUM SCH MG: 40 TABLET, DELAYED RELEASE ORAL at 11:45

## 2022-06-11 RX ADMIN — APIXABAN SCH MG: 2.5 TABLET, FILM COATED ORAL at 16:02

## 2022-06-11 RX ADMIN — SODIUM CHLORIDE SCH MLS/HR: 9 INJECTION, SOLUTION INTRAVENOUS at 09:02

## 2022-06-11 RX ADMIN — ISOSORBIDE MONONITRATE SCH MG: 30 TABLET, EXTENDED RELEASE ORAL at 16:02

## 2022-06-11 RX ADMIN — INSULIN HUMAN SCH UNIT: 100 INJECTION, SOLUTION PARENTERAL at 11:30

## 2022-06-11 RX ADMIN — DEXTROSE MONOHYDRATE SCH MLS/HR: 50 INJECTION, SOLUTION INTRAVENOUS at 09:02

## 2022-06-12 VITALS — DIASTOLIC BLOOD PRESSURE: 53 MMHG | SYSTOLIC BLOOD PRESSURE: 174 MMHG

## 2022-06-12 VITALS — SYSTOLIC BLOOD PRESSURE: 168 MMHG | DIASTOLIC BLOOD PRESSURE: 46 MMHG

## 2022-06-12 VITALS — SYSTOLIC BLOOD PRESSURE: 149 MMHG | DIASTOLIC BLOOD PRESSURE: 43 MMHG

## 2022-06-12 VITALS — SYSTOLIC BLOOD PRESSURE: 142 MMHG | DIASTOLIC BLOOD PRESSURE: 53 MMHG

## 2022-06-12 VITALS — DIASTOLIC BLOOD PRESSURE: 46 MMHG | SYSTOLIC BLOOD PRESSURE: 143 MMHG

## 2022-06-12 VITALS — DIASTOLIC BLOOD PRESSURE: 53 MMHG | SYSTOLIC BLOOD PRESSURE: 163 MMHG

## 2022-06-12 VITALS — DIASTOLIC BLOOD PRESSURE: 51 MMHG | SYSTOLIC BLOOD PRESSURE: 142 MMHG

## 2022-06-12 VITALS — SYSTOLIC BLOOD PRESSURE: 163 MMHG | DIASTOLIC BLOOD PRESSURE: 53 MMHG

## 2022-06-12 VITALS — DIASTOLIC BLOOD PRESSURE: 54 MMHG | SYSTOLIC BLOOD PRESSURE: 122 MMHG

## 2022-06-12 VITALS — DIASTOLIC BLOOD PRESSURE: 57 MMHG | SYSTOLIC BLOOD PRESSURE: 153 MMHG

## 2022-06-12 VITALS — DIASTOLIC BLOOD PRESSURE: 58 MMHG | SYSTOLIC BLOOD PRESSURE: 142 MMHG

## 2022-06-12 VITALS — SYSTOLIC BLOOD PRESSURE: 159 MMHG | DIASTOLIC BLOOD PRESSURE: 46 MMHG

## 2022-06-12 VITALS — DIASTOLIC BLOOD PRESSURE: 52 MMHG | SYSTOLIC BLOOD PRESSURE: 147 MMHG

## 2022-06-12 VITALS — SYSTOLIC BLOOD PRESSURE: 149 MMHG | DIASTOLIC BLOOD PRESSURE: 49 MMHG

## 2022-06-12 VITALS — SYSTOLIC BLOOD PRESSURE: 165 MMHG | DIASTOLIC BLOOD PRESSURE: 54 MMHG

## 2022-06-12 RX ADMIN — APIXABAN SCH MG: 2.5 TABLET, FILM COATED ORAL at 09:31

## 2022-06-12 RX ADMIN — Medication SCH MG: at 09:31

## 2022-06-12 RX ADMIN — CLOPIDOGREL BISULFATE SCH MG: 75 TABLET, FILM COATED ORAL at 09:32

## 2022-06-12 RX ADMIN — AMLODIPINE BESYLATE SCH MG: 10 TABLET ORAL at 09:32

## 2022-06-12 RX ADMIN — INSULIN HUMAN SCH UNIT: 100 INJECTION, SOLUTION PARENTERAL at 11:54

## 2022-06-12 RX ADMIN — HYDRALAZINE HYDROCHLORIDE SCH MG: 25 TABLET ORAL at 16:26

## 2022-06-12 RX ADMIN — SIMVASTATIN SCH MG: 20 TABLET, FILM COATED ORAL at 20:59

## 2022-06-12 RX ADMIN — PANTOPRAZOLE SODIUM SCH MG: 40 TABLET, DELAYED RELEASE ORAL at 09:31

## 2022-06-12 RX ADMIN — METOPROLOL TARTRATE SCH MG: 25 TABLET, FILM COATED ORAL at 09:00

## 2022-06-12 RX ADMIN — ISOSORBIDE MONONITRATE SCH MG: 30 TABLET, EXTENDED RELEASE ORAL at 09:31

## 2022-06-12 RX ADMIN — INSULIN HUMAN SCH UNIT: 100 INJECTION, SOLUTION PARENTERAL at 16:24

## 2022-06-12 RX ADMIN — INSULIN HUMAN SCH UNIT: 100 INJECTION, SOLUTION PARENTERAL at 07:30

## 2022-06-12 RX ADMIN — CHLORTHALIDONE SCH MG: 25 TABLET ORAL at 09:31

## 2022-06-12 RX ADMIN — APIXABAN SCH MG: 2.5 TABLET, FILM COATED ORAL at 16:26

## 2022-06-12 RX ADMIN — FENOFIBRATE SCH MG: 145 TABLET ORAL at 09:32

## 2022-06-12 RX ADMIN — INSULIN HUMAN SCH UNIT: 100 INJECTION, SOLUTION PARENTERAL at 21:01

## 2022-06-12 RX ADMIN — ISOSORBIDE MONONITRATE SCH MG: 30 TABLET, EXTENDED RELEASE ORAL at 16:26

## 2022-06-12 RX ADMIN — METOPROLOL TARTRATE SCH MG: 25 TABLET, FILM COATED ORAL at 16:26

## 2022-06-13 VITALS — SYSTOLIC BLOOD PRESSURE: 163 MMHG | DIASTOLIC BLOOD PRESSURE: 49 MMHG

## 2022-06-13 VITALS — SYSTOLIC BLOOD PRESSURE: 158 MMHG | DIASTOLIC BLOOD PRESSURE: 49 MMHG

## 2022-06-13 VITALS — SYSTOLIC BLOOD PRESSURE: 135 MMHG | DIASTOLIC BLOOD PRESSURE: 57 MMHG

## 2022-06-13 VITALS — SYSTOLIC BLOOD PRESSURE: 140 MMHG | DIASTOLIC BLOOD PRESSURE: 56 MMHG

## 2022-06-13 VITALS — DIASTOLIC BLOOD PRESSURE: 51 MMHG | SYSTOLIC BLOOD PRESSURE: 146 MMHG

## 2022-06-13 VITALS — DIASTOLIC BLOOD PRESSURE: 48 MMHG | SYSTOLIC BLOOD PRESSURE: 135 MMHG

## 2022-06-13 VITALS — DIASTOLIC BLOOD PRESSURE: 48 MMHG | SYSTOLIC BLOOD PRESSURE: 130 MMHG

## 2022-06-13 VITALS — DIASTOLIC BLOOD PRESSURE: 49 MMHG | SYSTOLIC BLOOD PRESSURE: 163 MMHG

## 2022-06-13 VITALS — DIASTOLIC BLOOD PRESSURE: 84 MMHG | SYSTOLIC BLOOD PRESSURE: 128 MMHG

## 2022-06-13 VITALS — SYSTOLIC BLOOD PRESSURE: 164 MMHG | DIASTOLIC BLOOD PRESSURE: 46 MMHG

## 2022-06-13 VITALS — DIASTOLIC BLOOD PRESSURE: 58 MMHG | SYSTOLIC BLOOD PRESSURE: 141 MMHG

## 2022-06-13 VITALS — SYSTOLIC BLOOD PRESSURE: 162 MMHG | DIASTOLIC BLOOD PRESSURE: 96 MMHG

## 2022-06-13 VITALS — SYSTOLIC BLOOD PRESSURE: 147 MMHG | DIASTOLIC BLOOD PRESSURE: 56 MMHG

## 2022-06-13 VITALS — SYSTOLIC BLOOD PRESSURE: 115 MMHG | DIASTOLIC BLOOD PRESSURE: 53 MMHG

## 2022-06-13 VITALS — DIASTOLIC BLOOD PRESSURE: 91 MMHG | SYSTOLIC BLOOD PRESSURE: 145 MMHG

## 2022-06-13 LAB
ANION GAP SERPL CALC-SCNC: 13.6 MMOL/L (ref 8–16)
BUN SERPL-MCNC: 27 MG/DL (ref 7–26)
BUN/CREAT SERPL: 22 (ref 6–25)
CALCIUM SERPL-MCNC: 9.4 MG/DL (ref 8.4–10.2)
CHLORIDE SERPL-SCNC: 109 MMOL/L (ref 98–107)
CO2 SERPL-SCNC: 23 MMOL/L (ref 22–29)
GLUCOSE SERPLBLD-MCNC: 155 MG/DL (ref 74–118)
POTASSIUM SERPL-SCNC: 3.6 MMOL/L (ref 3.5–5.1)
SODIUM SERPL-SCNC: 142 MMOL/L (ref 136–145)

## 2022-06-13 RX ADMIN — METOPROLOL TARTRATE SCH MG: 25 TABLET, FILM COATED ORAL at 09:00

## 2022-06-13 RX ADMIN — AMLODIPINE BESYLATE SCH MG: 10 TABLET ORAL at 10:09

## 2022-06-13 RX ADMIN — ISOSORBIDE MONONITRATE SCH MG: 30 TABLET, EXTENDED RELEASE ORAL at 10:08

## 2022-06-13 RX ADMIN — HYDRALAZINE HYDROCHLORIDE SCH MG: 25 TABLET ORAL at 17:16

## 2022-06-13 RX ADMIN — METOPROLOL TARTRATE SCH MG: 25 TABLET, FILM COATED ORAL at 17:17

## 2022-06-13 RX ADMIN — ISOSORBIDE MONONITRATE SCH MG: 30 TABLET, EXTENDED RELEASE ORAL at 17:17

## 2022-06-13 RX ADMIN — APIXABAN SCH MG: 2.5 TABLET, FILM COATED ORAL at 10:04

## 2022-06-13 RX ADMIN — INSULIN HUMAN SCH UNIT: 100 INJECTION, SOLUTION PARENTERAL at 11:45

## 2022-06-13 RX ADMIN — CLOPIDOGREL BISULFATE SCH MG: 75 TABLET, FILM COATED ORAL at 10:04

## 2022-06-13 RX ADMIN — FENOFIBRATE SCH MG: 145 TABLET ORAL at 10:07

## 2022-06-13 RX ADMIN — PANTOPRAZOLE SODIUM SCH MG: 40 TABLET, DELAYED RELEASE ORAL at 10:10

## 2022-06-13 RX ADMIN — INSULIN HUMAN SCH UNIT: 100 INJECTION, SOLUTION PARENTERAL at 18:08

## 2022-06-13 RX ADMIN — HYDRALAZINE HYDROCHLORIDE SCH MG: 25 TABLET ORAL at 10:06

## 2022-06-13 RX ADMIN — CHLORTHALIDONE SCH MG: 25 TABLET ORAL at 10:07

## 2022-06-13 RX ADMIN — Medication SCH MG: at 10:04

## 2022-06-13 RX ADMIN — INSULIN HUMAN SCH UNIT: 100 INJECTION, SOLUTION PARENTERAL at 10:04

## 2022-06-13 RX ADMIN — APIXABAN SCH MG: 2.5 TABLET, FILM COATED ORAL at 17:16

## 2023-05-03 ENCOUNTER — HOSPITAL ENCOUNTER (EMERGENCY)
Dept: HOSPITAL 88 - ER | Age: 78
Discharge: HOME | End: 2023-05-03
Payer: MEDICARE

## 2023-05-03 VITALS — HEIGHT: 69 IN | WEIGHT: 185 LBS | BODY MASS INDEX: 27.4 KG/M2

## 2023-05-03 DIAGNOSIS — S50.311A: ICD-10-CM

## 2023-05-03 DIAGNOSIS — S90.411A: ICD-10-CM

## 2023-05-03 DIAGNOSIS — I25.2: ICD-10-CM

## 2023-05-03 DIAGNOSIS — I10: ICD-10-CM
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
## 2023-05-03 DIAGNOSIS — K21.9: ICD-10-CM
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
## 2023-05-03 DIAGNOSIS — S90.511A: ICD-10-CM

## 2023-05-03 DIAGNOSIS — S60.511A: ICD-10-CM

## 2023-05-03 DIAGNOSIS — S00.83XA: Primary | ICD-10-CM

## 2023-05-03 DIAGNOSIS — E11.9: ICD-10-CM
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
## 2023-05-03 DIAGNOSIS — I25.10: ICD-10-CM

## 2023-05-03 DIAGNOSIS — Z79.01: ICD-10-CM

## 2023-05-03 PROCEDURE — 90714 TD VACC NO PRESV 7 YRS+ IM: CPT

## 2023-05-03 PROCEDURE — 70450 CT HEAD/BRAIN W/O DYE: CPT

## 2023-05-03 PROCEDURE — 72125 CT NECK SPINE W/O DYE: CPT

## 2023-05-03 PROCEDURE — 99283 EMERGENCY DEPT VISIT LOW MDM: CPT
